# Patient Record
Sex: FEMALE | Race: WHITE | Employment: OTHER | URBAN - METROPOLITAN AREA
[De-identification: names, ages, dates, MRNs, and addresses within clinical notes are randomized per-mention and may not be internally consistent; named-entity substitution may affect disease eponyms.]

---

## 2023-01-29 ENCOUNTER — APPOINTMENT (OUTPATIENT)
Dept: NON INVASIVE DIAGNOSTICS | Age: 78
End: 2023-01-29
Attending: INTERNAL MEDICINE
Payer: MEDICARE

## 2023-01-29 ENCOUNTER — HOSPITAL ENCOUNTER (INPATIENT)
Age: 78
LOS: 4 days | Discharge: HOME | End: 2023-02-02
Attending: INTERNAL MEDICINE
Payer: MEDICARE

## 2023-01-29 ENCOUNTER — APPOINTMENT (OUTPATIENT)
Dept: GENERAL RADIOLOGY | Age: 78
End: 2023-01-29
Attending: INTERNAL MEDICINE
Payer: MEDICARE

## 2023-01-29 DIAGNOSIS — R07.9 CHEST PAIN, UNSPECIFIED TYPE: Primary | ICD-10-CM

## 2023-01-29 PROBLEM — I25.10 CAD (CORONARY ARTERY DISEASE): Chronic | Status: ACTIVE | Noted: 2023-01-29

## 2023-01-29 PROBLEM — R41.0 CONFUSION: Status: ACTIVE | Noted: 2023-01-29

## 2023-01-29 PROBLEM — Z95.1 S/P CABG X 3: Chronic | Status: ACTIVE | Noted: 2023-01-29

## 2023-01-29 PROBLEM — Z79.01 WARFARIN ANTICOAGULATION: Chronic | Status: ACTIVE | Noted: 2023-01-29

## 2023-01-29 PROBLEM — R79.1 SUPRATHERAPEUTIC INR: Status: ACTIVE | Noted: 2023-01-29

## 2023-01-29 PROBLEM — I50.9 CHF (CONGESTIVE HEART FAILURE) (HCC): Chronic | Status: ACTIVE | Noted: 2023-01-29

## 2023-01-29 PROBLEM — E78.5 HLD (HYPERLIPIDEMIA): Chronic | Status: ACTIVE | Noted: 2023-01-29

## 2023-01-29 PROBLEM — I48.0 PAF (PAROXYSMAL ATRIAL FIBRILLATION) (HCC): Chronic | Status: ACTIVE | Noted: 2023-01-29

## 2023-01-29 PROBLEM — R53.1 WEAKNESS: Status: ACTIVE | Noted: 2023-01-29

## 2023-01-29 LAB
ANION GAP SERPL CALC-SCNC: 7 MMOL/L (ref 2–11)
APPEARANCE UR: CLEAR
BACTERIA URNS QL MICRO: NORMAL /HPF
BILIRUB UR QL: NEGATIVE
BUN SERPL-MCNC: 47 MG/DL (ref 8–23)
CALCIUM SERPL-MCNC: 8.4 MG/DL (ref 8.3–10.4)
CASTS URNS QL MICRO: 0 /LPF
CHLORIDE SERPL-SCNC: 100 MMOL/L (ref 101–110)
CHOLEST SERPL-MCNC: 103 MG/DL
CO2 SERPL-SCNC: 28 MMOL/L (ref 21–32)
COLOR UR: NORMAL
CREAT SERPL-MCNC: 1.2 MG/DL (ref 0.6–1)
CRYSTALS URNS QL MICRO: 0 /LPF
DIGOXIN SERPL-MCNC: 1.9 NG/ML (ref 0.9–2.1)
ECHO AO ASC DIAM: 3.5 CM
ECHO AO ASCENDING AORTA INDEX: 2.41 CM/M2
ECHO AO ROOT DIAM: 3.2 CM
ECHO AO ROOT INDEX: 2.21 CM/M2
ECHO AV AREA PEAK VELOCITY: 1.7 CM2
ECHO AV AREA VTI: 1.9 CM2
ECHO AV AREA/BSA PEAK VELOCITY: 1.2 CM2/M2
ECHO AV AREA/BSA VTI: 1.3 CM2/M2
ECHO AV MEAN GRADIENT: 6 MMHG
ECHO AV MEAN VELOCITY: 1.2 M/S
ECHO AV PEAK GRADIENT: 12 MMHG
ECHO AV PEAK VELOCITY: 1.7 M/S
ECHO AV VELOCITY RATIO: 0.59
ECHO AV VTI: 28.6 CM
ECHO BSA: 1.42 M2
ECHO EST RA PRESSURE: 3 MMHG
ECHO IVC PROX: 0.9 CM
ECHO LA AREA 2C: 17.4 CM2
ECHO LA AREA 4C: 16.1 CM2
ECHO LA DIAMETER INDEX: 2.34 CM/M2
ECHO LA DIAMETER: 3.4 CM
ECHO LA MAJOR AXIS: 5 CM
ECHO LA MINOR AXIS: 5 CM
ECHO LA TO AORTIC ROOT RATIO: 1.06
ECHO LA VOL 2C: 49 ML (ref 22–52)
ECHO LA VOL 4C: 41 ML (ref 22–52)
ECHO LA VOL BP: 45 ML (ref 22–52)
ECHO LA VOL/BSA BIPLANE: 31 ML/M2 (ref 16–34)
ECHO LA VOLUME INDEX A2C: 34 ML/M2 (ref 16–34)
ECHO LA VOLUME INDEX A4C: 28 ML/M2 (ref 16–34)
ECHO LV E' LATERAL VELOCITY: 7 CM/S
ECHO LV E' SEPTAL VELOCITY: 5 CM/S
ECHO LV EDV A2C: 102 ML
ECHO LV EDV A4C: 125 ML
ECHO LV EDV INDEX A4C: 86 ML/M2
ECHO LV EDV NDEX A2C: 70 ML/M2
ECHO LV EJECTION FRACTION A2C: 63 %
ECHO LV EJECTION FRACTION A4C: 50 %
ECHO LV EJECTION FRACTION BIPLANE: 57 % (ref 55–100)
ECHO LV ESV A2C: 38 ML
ECHO LV ESV A4C: 62 ML
ECHO LV ESV INDEX A2C: 26 ML/M2
ECHO LV ESV INDEX A4C: 43 ML/M2
ECHO LV FRACTIONAL SHORTENING: 9 % (ref 28–44)
ECHO LV INTERNAL DIMENSION DIASTOLE INDEX: 3.1 CM/M2
ECHO LV INTERNAL DIMENSION DIASTOLIC: 4.5 CM (ref 3.9–5.3)
ECHO LV INTERNAL DIMENSION SYSTOLIC INDEX: 2.83 CM/M2
ECHO LV INTERNAL DIMENSION SYSTOLIC: 4.1 CM
ECHO LV IVSD: 1 CM (ref 0.6–0.9)
ECHO LV MASS 2D: 164 G (ref 67–162)
ECHO LV MASS INDEX 2D: 113.1 G/M2 (ref 43–95)
ECHO LV POSTERIOR WALL DIASTOLIC: 1.1 CM (ref 0.6–0.9)
ECHO LV RELATIVE WALL THICKNESS RATIO: 0.49
ECHO LVOT AREA: 2.8 CM2
ECHO LVOT AV VTI INDEX: 0.68
ECHO LVOT DIAM: 1.9 CM
ECHO LVOT MEAN GRADIENT: 1 MMHG
ECHO LVOT PEAK GRADIENT: 4 MMHG
ECHO LVOT PEAK VELOCITY: 1 M/S
ECHO LVOT STROKE VOLUME INDEX: 37.9 ML/M2
ECHO LVOT SV: 55 ML
ECHO LVOT VTI: 19.4 CM
ECHO MV A VELOCITY: 1.13 M/S
ECHO MV E DECELERATION TIME (DT): 266 MS
ECHO MV E VELOCITY: 0.72 M/S
ECHO MV E/A RATIO: 0.64
ECHO MV E/E' LATERAL: 10.29
ECHO MV E/E' RATIO (AVERAGED): 12.34
ECHO MV E/E' SEPTAL: 14.4
ECHO PV MAX VELOCITY: 0.8 M/S
ECHO PV PEAK GRADIENT: 3 MMHG
ECHO RIGHT VENTRICULAR SYSTOLIC PRESSURE (RVSP): 18 MMHG
ECHO RV BASAL DIMENSION: 3.6 CM
ECHO RV FREE WALL PEAK S': 13 CM/S
ECHO RV TAPSE: 1.5 CM (ref 1.7–?)
ECHO TV REGURGITANT MAX VELOCITY: 1.95 M/S
ECHO TV REGURGITANT PEAK GRADIENT: 15 MMHG
EPI CELLS #/AREA URNS HPF: NORMAL /HPF
ERYTHROCYTE [DISTWIDTH] IN BLOOD BY AUTOMATED COUNT: 20.3 % (ref 11.9–14.6)
EST. AVERAGE GLUCOSE BLD GHB EST-MCNC: 108 MG/DL
GLUCOSE SERPL-MCNC: 115 MG/DL (ref 65–100)
GLUCOSE UR STRIP.AUTO-MCNC: NEGATIVE MG/DL
HBA1C MFR BLD: 5.4 % (ref 4.8–5.6)
HCT VFR BLD AUTO: 27.7 % (ref 35.8–46.3)
HDLC SERPL-MCNC: 46 MG/DL (ref 40–60)
HDLC SERPL: 2.2
HGB BLD-MCNC: 8.9 G/DL (ref 11.7–15.4)
HGB UR QL STRIP: NEGATIVE
INR PPP: 7.8
KETONES UR QL STRIP.AUTO: NEGATIVE MG/DL
LACTATE SERPL-SCNC: 1.1 MMOL/L (ref 0.4–2)
LDLC SERPL CALC-MCNC: 45.6 MG/DL
LEUKOCYTE ESTERASE UR QL STRIP.AUTO: NEGATIVE
LV EF: 53 %
LVEF MODALITY: ABNORMAL
MAGNESIUM SERPL-MCNC: 2.3 MG/DL (ref 1.8–2.4)
MCH RBC QN AUTO: 28.5 PG (ref 26.1–32.9)
MCHC RBC AUTO-ENTMCNC: 32.1 G/DL (ref 31.4–35)
MCV RBC AUTO: 88.8 FL (ref 82–102)
MUCOUS THREADS URNS QL MICRO: 0 /LPF
NITRITE UR QL STRIP.AUTO: NEGATIVE
NRBC # BLD: 0 K/UL (ref 0–0.2)
PH UR STRIP: 5 (ref 5–9)
PLATELET # BLD AUTO: 212 K/UL (ref 150–450)
PMV BLD AUTO: 10.5 FL (ref 9.4–12.3)
POTASSIUM SERPL-SCNC: 4.2 MMOL/L (ref 3.5–5.1)
PROCALCITONIN SERPL-MCNC: <0.05 NG/ML (ref 0–0.49)
PROT UR STRIP-MCNC: NEGATIVE MG/DL
PROTHROMBIN TIME: 66.9 SEC (ref 12.6–14.3)
RBC # BLD AUTO: 3.12 M/UL (ref 4.05–5.2)
RBC #/AREA URNS HPF: 0 /HPF
SODIUM SERPL-SCNC: 135 MMOL/L (ref 133–143)
SP GR UR REFRACTOMETRY: 1.02 (ref 1–1.02)
T4 FREE SERPL-MCNC: 1.3 NG/DL (ref 0.78–1.46)
TRIGL SERPL-MCNC: 57 MG/DL (ref 35–150)
TROPONIN I BLD-MCNC: 0.03 NG/ML (ref 0.02–0.05)
TROPONIN I BLD-MCNC: 0.03 NG/ML (ref 0.02–0.05)
TROPONIN I BLD-MCNC: 0.06 NG/ML (ref 0.02–0.05)
TSH W FREE THYROID IF ABNORMAL: 4.5 UIU/ML (ref 0.36–3.74)
URINE CULTURE IF INDICATED: NORMAL
UROBILINOGEN UR QL STRIP.AUTO: 0.2 EU/DL (ref 0.2–1)
VLDLC SERPL CALC-MCNC: 11.4 MG/DL (ref 6–23)
WBC # BLD AUTO: 5.7 K/UL (ref 4.3–11.1)
WBC URNS QL MICRO: NORMAL /HPF

## 2023-01-29 PROCEDURE — 84484 ASSAY OF TROPONIN QUANT: CPT

## 2023-01-29 PROCEDURE — 6370000000 HC RX 637 (ALT 250 FOR IP): Performed by: PHYSICIAN ASSISTANT

## 2023-01-29 PROCEDURE — A4216 STERILE WATER/SALINE, 10 ML: HCPCS | Performed by: INTERNAL MEDICINE

## 2023-01-29 PROCEDURE — 2500000003 HC RX 250 WO HCPCS: Performed by: INTERNAL MEDICINE

## 2023-01-29 PROCEDURE — 71045 X-RAY EXAM CHEST 1 VIEW: CPT

## 2023-01-29 PROCEDURE — 84145 PROCALCITONIN (PCT): CPT

## 2023-01-29 PROCEDURE — 81001 URINALYSIS AUTO W/SCOPE: CPT

## 2023-01-29 PROCEDURE — 83735 ASSAY OF MAGNESIUM: CPT

## 2023-01-29 PROCEDURE — 36415 COLL VENOUS BLD VENIPUNCTURE: CPT

## 2023-01-29 PROCEDURE — C8929 TTE W OR WO FOL WCON,DOPPLER: HCPCS

## 2023-01-29 PROCEDURE — 99223 1ST HOSP IP/OBS HIGH 75: CPT | Performed by: INTERNAL MEDICINE

## 2023-01-29 PROCEDURE — 86923 COMPATIBILITY TEST ELECTRIC: CPT

## 2023-01-29 PROCEDURE — 2580000003 HC RX 258: Performed by: NURSE PRACTITIONER

## 2023-01-29 PROCEDURE — 6360000004 HC RX CONTRAST MEDICATION: Performed by: INTERNAL MEDICINE

## 2023-01-29 PROCEDURE — 86901 BLOOD TYPING SEROLOGIC RH(D): CPT

## 2023-01-29 PROCEDURE — 2580000003 HC RX 258: Performed by: INTERNAL MEDICINE

## 2023-01-29 PROCEDURE — 83036 HEMOGLOBIN GLYCOSYLATED A1C: CPT

## 2023-01-29 PROCEDURE — 80048 BASIC METABOLIC PNL TOTAL CA: CPT

## 2023-01-29 PROCEDURE — 84443 ASSAY THYROID STIM HORMONE: CPT

## 2023-01-29 PROCEDURE — 1100000003 HC PRIVATE W/ TELEMETRY

## 2023-01-29 PROCEDURE — 84439 ASSAY OF FREE THYROXINE: CPT

## 2023-01-29 PROCEDURE — 85027 COMPLETE CBC AUTOMATED: CPT

## 2023-01-29 PROCEDURE — 6370000000 HC RX 637 (ALT 250 FOR IP): Performed by: NURSE PRACTITIONER

## 2023-01-29 PROCEDURE — 94640 AIRWAY INHALATION TREATMENT: CPT

## 2023-01-29 PROCEDURE — 93306 TTE W/DOPPLER COMPLETE: CPT | Performed by: INTERNAL MEDICINE

## 2023-01-29 PROCEDURE — 94761 N-INVAS EAR/PLS OXIMETRY MLT: CPT

## 2023-01-29 PROCEDURE — 94760 N-INVAS EAR/PLS OXIMETRY 1: CPT

## 2023-01-29 PROCEDURE — 80162 ASSAY OF DIGOXIN TOTAL: CPT

## 2023-01-29 PROCEDURE — 83605 ASSAY OF LACTIC ACID: CPT

## 2023-01-29 PROCEDURE — 85610 PROTHROMBIN TIME: CPT

## 2023-01-29 PROCEDURE — 80061 LIPID PANEL: CPT

## 2023-01-29 RX ORDER — METOPROLOL SUCCINATE 25 MG/1
25 TABLET, EXTENDED RELEASE ORAL DAILY
Status: DISCONTINUED | OUTPATIENT
Start: 2023-01-29 | End: 2023-02-02 | Stop reason: HOSPADM

## 2023-01-29 RX ORDER — ACETAMINOPHEN 325 MG/1
650 TABLET ORAL EVERY 6 HOURS PRN
Status: DISCONTINUED | OUTPATIENT
Start: 2023-01-29 | End: 2023-02-02 | Stop reason: HOSPADM

## 2023-01-29 RX ORDER — NITROGLYCERIN 0.4 MG/1
0.4 TABLET SUBLINGUAL EVERY 5 MIN PRN
Status: DISCONTINUED | OUTPATIENT
Start: 2023-01-29 | End: 2023-02-02 | Stop reason: HOSPADM

## 2023-01-29 RX ORDER — SODIUM CHLORIDE 0.9 % (FLUSH) 0.9 %
5-40 SYRINGE (ML) INJECTION EVERY 12 HOURS SCHEDULED
Status: DISCONTINUED | OUTPATIENT
Start: 2023-01-29 | End: 2023-02-02 | Stop reason: HOSPADM

## 2023-01-29 RX ORDER — METOPROLOL SUCCINATE 25 MG/1
25 TABLET, EXTENDED RELEASE ORAL DAILY
COMMUNITY

## 2023-01-29 RX ORDER — SODIUM CHLORIDE 0.9 % (FLUSH) 0.9 %
5-40 SYRINGE (ML) INJECTION PRN
Status: DISCONTINUED | OUTPATIENT
Start: 2023-01-29 | End: 2023-02-02 | Stop reason: HOSPADM

## 2023-01-29 RX ORDER — VENLAFAXINE 37.5 MG/1
37.5 TABLET ORAL DAILY
COMMUNITY

## 2023-01-29 RX ORDER — ACETAMINOPHEN 650 MG/1
650 SUPPOSITORY RECTAL EVERY 6 HOURS PRN
Status: DISCONTINUED | OUTPATIENT
Start: 2023-01-29 | End: 2023-02-02 | Stop reason: HOSPADM

## 2023-01-29 RX ORDER — MAGNESIUM HYDROXIDE/ALUMINUM HYDROXICE/SIMETHICONE 120; 1200; 1200 MG/30ML; MG/30ML; MG/30ML
30 SUSPENSION ORAL EVERY 6 HOURS PRN
Status: DISCONTINUED | OUTPATIENT
Start: 2023-01-29 | End: 2023-02-02 | Stop reason: HOSPADM

## 2023-01-29 RX ORDER — ONDANSETRON 4 MG/1
4 TABLET, ORALLY DISINTEGRATING ORAL EVERY 8 HOURS PRN
Status: DISCONTINUED | OUTPATIENT
Start: 2023-01-29 | End: 2023-02-02 | Stop reason: HOSPADM

## 2023-01-29 RX ORDER — DIGOXIN 125 MCG
125 TABLET ORAL DAILY
Status: DISCONTINUED | OUTPATIENT
Start: 2023-01-29 | End: 2023-01-29

## 2023-01-29 RX ORDER — POLYETHYLENE GLYCOL 3350 17 G/17G
17 POWDER, FOR SOLUTION ORAL DAILY PRN
Status: DISCONTINUED | OUTPATIENT
Start: 2023-01-29 | End: 2023-02-02 | Stop reason: HOSPADM

## 2023-01-29 RX ORDER — SPIRONOLACTONE 25 MG/1
25 TABLET ORAL 2 TIMES DAILY
Status: ON HOLD | COMMUNITY
End: 2023-02-02 | Stop reason: HOSPADM

## 2023-01-29 RX ORDER — ONDANSETRON 2 MG/ML
4 INJECTION INTRAMUSCULAR; INTRAVENOUS EVERY 6 HOURS PRN
Status: DISCONTINUED | OUTPATIENT
Start: 2023-01-29 | End: 2023-02-02 | Stop reason: HOSPADM

## 2023-01-29 RX ORDER — FERROUS SULFATE 325(65) MG
325 TABLET ORAL
COMMUNITY

## 2023-01-29 RX ORDER — FLUTICASONE PROPIONATE AND SALMETEROL 100; 50 UG/1; UG/1
1 POWDER RESPIRATORY (INHALATION) 2 TIMES DAILY
Status: ON HOLD | COMMUNITY
End: 2023-01-31

## 2023-01-29 RX ORDER — MORPHINE SULFATE 2 MG/ML
2 INJECTION, SOLUTION INTRAMUSCULAR; INTRAVENOUS
Status: DISCONTINUED | OUTPATIENT
Start: 2023-01-29 | End: 2023-02-02 | Stop reason: HOSPADM

## 2023-01-29 RX ORDER — MORPHINE SULFATE 4 MG/ML
4 INJECTION, SOLUTION INTRAMUSCULAR; INTRAVENOUS
Status: DISCONTINUED | OUTPATIENT
Start: 2023-01-29 | End: 2023-02-02 | Stop reason: HOSPADM

## 2023-01-29 RX ORDER — DIGOXIN 125 MCG
125 TABLET ORAL DAILY
Status: ON HOLD | COMMUNITY
End: 2023-02-02 | Stop reason: HOSPADM

## 2023-01-29 RX ORDER — FERROUS SULFATE 325(65) MG
325 TABLET ORAL
Status: DISCONTINUED | OUTPATIENT
Start: 2023-01-29 | End: 2023-02-02 | Stop reason: HOSPADM

## 2023-01-29 RX ORDER — VENLAFAXINE 25 MG/1
37.5 TABLET ORAL DAILY
Status: DISCONTINUED | OUTPATIENT
Start: 2023-01-29 | End: 2023-02-02 | Stop reason: HOSPADM

## 2023-01-29 RX ORDER — TRAMADOL HYDROCHLORIDE 50 MG/1
50 TABLET ORAL EVERY 6 HOURS PRN
Status: DISCONTINUED | OUTPATIENT
Start: 2023-01-29 | End: 2023-02-02 | Stop reason: HOSPADM

## 2023-01-29 RX ORDER — ATORVASTATIN CALCIUM 40 MG/1
40 TABLET, FILM COATED ORAL DAILY
Status: DISCONTINUED | OUTPATIENT
Start: 2023-01-29 | End: 2023-02-02 | Stop reason: HOSPADM

## 2023-01-29 RX ORDER — SODIUM CHLORIDE 9 MG/ML
INJECTION, SOLUTION INTRAVENOUS PRN
Status: DISCONTINUED | OUTPATIENT
Start: 2023-01-29 | End: 2023-02-02 | Stop reason: HOSPADM

## 2023-01-29 RX ORDER — POTASSIUM CHLORIDE 7.45 MG/ML
10 INJECTION INTRAVENOUS PRN
Status: DISCONTINUED | OUTPATIENT
Start: 2023-01-29 | End: 2023-02-02 | Stop reason: HOSPADM

## 2023-01-29 RX ORDER — FUROSEMIDE 20 MG/1
20 TABLET ORAL DAILY
Status: ON HOLD | COMMUNITY
End: 2023-02-02 | Stop reason: HOSPADM

## 2023-01-29 RX ORDER — MAGNESIUM SULFATE IN WATER 40 MG/ML
2000 INJECTION, SOLUTION INTRAVENOUS PRN
Status: DISCONTINUED | OUTPATIENT
Start: 2023-01-29 | End: 2023-02-02 | Stop reason: HOSPADM

## 2023-01-29 RX ORDER — POTASSIUM CHLORIDE 20 MEQ/1
40 TABLET, EXTENDED RELEASE ORAL PRN
Status: DISCONTINUED | OUTPATIENT
Start: 2023-01-29 | End: 2023-02-02 | Stop reason: HOSPADM

## 2023-01-29 RX ORDER — ASPIRIN 81 MG/1
81 TABLET, CHEWABLE ORAL DAILY
Status: DISCONTINUED | OUTPATIENT
Start: 2023-01-30 | End: 2023-02-02 | Stop reason: HOSPADM

## 2023-01-29 RX ORDER — ATORVASTATIN CALCIUM 40 MG/1
40 TABLET, FILM COATED ORAL NIGHTLY
Status: DISCONTINUED | OUTPATIENT
Start: 2023-01-29 | End: 2023-01-29

## 2023-01-29 RX ORDER — ATORVASTATIN CALCIUM 40 MG/1
40 TABLET, FILM COATED ORAL DAILY
COMMUNITY

## 2023-01-29 RX ADMIN — SODIUM CHLORIDE, PRESERVATIVE FREE 10 ML: 5 INJECTION INTRAVENOUS at 20:11

## 2023-01-29 RX ADMIN — MOMETASONE FUROATE AND FORMOTEROL FUMARATE DIHYDRATE 2 PUFF: 100; 5 AEROSOL RESPIRATORY (INHALATION) at 20:52

## 2023-01-29 RX ADMIN — ATORVASTATIN CALCIUM 40 MG: 40 TABLET, FILM COATED ORAL at 08:41

## 2023-01-29 RX ADMIN — DIGOXIN 125 MCG: 125 TABLET ORAL at 08:41

## 2023-01-29 RX ADMIN — METOPROLOL SUCCINATE 25 MG: 25 TABLET, EXTENDED RELEASE ORAL at 08:39

## 2023-01-29 RX ADMIN — MOMETASONE FUROATE AND FORMOTEROL FUMARATE DIHYDRATE 2 PUFF: 100; 5 AEROSOL RESPIRATORY (INHALATION) at 09:47

## 2023-01-29 RX ADMIN — VENLAFAXINE 37.5 MG: 25 TABLET ORAL at 08:39

## 2023-01-29 RX ADMIN — TUBERCULIN PURIFIED PROTEIN DERIVATIVE 5 UNITS: 5 INJECTION, SOLUTION INTRADERMAL at 13:45

## 2023-01-29 RX ADMIN — TRAMADOL HYDROCHLORIDE 50 MG: 50 TABLET ORAL at 08:45

## 2023-01-29 RX ADMIN — FERROUS SULFATE TAB 325 MG (65 MG ELEMENTAL FE) 325 MG: 325 (65 FE) TAB at 08:41

## 2023-01-29 RX ADMIN — PERFLUTREN 0.45 ML: 6.52 INJECTION, SUSPENSION INTRAVENOUS at 09:45

## 2023-01-29 RX ADMIN — SODIUM CHLORIDE, PRESERVATIVE FREE 10 ML: 5 INJECTION INTRAVENOUS at 08:43

## 2023-01-29 ASSESSMENT — PAIN SCALES - GENERAL
PAINLEVEL_OUTOF10: 0
PAINLEVEL_OUTOF10: 6
PAINLEVEL_OUTOF10: 0

## 2023-01-29 ASSESSMENT — PAIN DESCRIPTION - ORIENTATION: ORIENTATION: LEFT

## 2023-01-29 ASSESSMENT — PAIN DESCRIPTION - LOCATION: LOCATION: LEG

## 2023-01-29 ASSESSMENT — PAIN DESCRIPTION - DESCRIPTORS: DESCRIPTORS: CRAMPING

## 2023-01-29 NOTE — PROGRESS NOTES
Reviewed notes for new spiritual concerns      Will continue to assess how we can best serve this family        [place].       Per notes:       [facility]        DNR    NO DIRECTIVES    HIGH RISK FALLS

## 2023-01-29 NOTE — PROGRESS NOTES
[facility] Hospitalist Service  At the heart of better care     Advance Care Planning   Admit Date:  2023  3:28 AM   Name:  Charmayne Portland   Age:  65 y.o. Sex:  female  :  1945   MRN:  [id number]   Room:  [facility]/    Charmayne Portland is able to make her own decisions:   NO    If pt unable to make decisions, POA/surrogate decision maker:  Penelope Lesley ()    Other people present:       Patient / surrogate decision-maker directed code status:  Full code    Other ACP topics discussed, if applicable:   none    Patient or surrogate consented to discussion of the current conditions, workup, management plans, prognosis, and the risk for further deterioration. Time spent: 17 minutes in direct discussion.       Signed:  Carlos Vuong MD

## 2023-01-29 NOTE — PROGRESS NOTES
TRANSFER - IN REPORT:    Verbal report received from Andi Baird, [facility] on Fantasma Nolasco  being received from [facility] ED for routine progression of patient care      Report consisted of patient's Situation, Background, Assessment and   Recommendations(SBAR). Information from the following report(s) ED Encounter Summary and ED SBAR was reviewed with the receiving nurse. Opportunity for questions and clarification was provided. Assessment completed upon patient's arrival to unit and care assumed. Dual skin assessment performed. Unable to accurately complete home med list due to pt confusion and family unable to remember. Family states there should be 10 pills. Pt has a lot of bruising in her BUE and her heels are noted to be slightly pink, but are blanchable.

## 2023-01-29 NOTE — H&P
[facility] CARDIOLOGY   History & Physical                 Primary Cardiologist: MD in [address] [place] [address]    Primary Care Physician: Dr. Florecita Meléndez    Admitting Physician: Dr. Jazmine Arellano:     Patient is a 65 y. o.  female with reported PMHx of CAD, CABG, HLD, PAF, CHF and confusion who presented to [facility] in transfer from [facility] in [address] [place] [address] for evaluation of CP. Please note: Pt is confused and alert to person only although she is pleasant and does answer some questions appropriately -- unclear accuracy of answers. Spouse is present and attempts to provide medical history but is a poor historian and states and all her records are in her chart. No access to any outside records at this time other than the ED report that came with Pt. Pt apparently presented to the ED at [facility] around 1600 on Saturday for evaluation of CP and worsening confusion and was making repetative statements.  states Pt c/o CP but that he was more concerned because she was weak and was having to use her walker. Usually able to ambulate w/o walker. States she had weakness in her arms and legs bilaterally. They were on the way to the ED and she started to get more confused. He stopped at the [facility] and EMS was called. She was given 324mg ASA. He denies recent fever but states that Friday night she had night sweats and was \"soaking wet\" but that she sleeps under an electric blanket. He states that she takes 10 pills. Takes all meds 1x daily except for the Sherlyn Mad that she is supposed to take 2x daily but usually only takes 1x daily. He states she is on meds for high cholesterol as well. Denies any DM. States she has been taking Warfarin for <6mo. Initially he is unsure why she is taking Warfarin. States she gets INR checked every month. Last known value was 2.7 but she hasn't had level checked yet this month. When suggested that Warfarin is typically for Afib or DVT/PE spouse states that Pt has \"Afib. \" She has apparently had this for some time but was just started on the Warfarin more recently per his report. He states that she has a Cardiologist in [address] [place] [address] but that her MD \"quit\" and they have not seen her new MD yet. She has a PCP that she sees routinely. Reports has labs about 1x/yr but he is unsure of any recent values. He is concerned that she is \"anemic. \" Reports noting darker than usual stools. He states she was Hospitalized in Oct/Nov and diagnosed with \"heart failure\" and required 2u blood during admit for \"bleeding. \" States they were told her heart was \"17%. \" Reports that prior to this hospitalization he was unaware that Pt had any heart failure. States when she had her CABG 8-9 yrs ago they were told her heart was \"strong. \" They deny any stents and state no other cardiac tests recently. W/u done in the ED -- CT head (-) acute findings, Trop 0.050 (range 0->0.034). UA done (no results posted). Reportedly COVID (-). She was given 2mg Morphine x2 and 2.5mg IVP Haldol for her confusion. Per ER MD note -- Pts Hgb 9.1 and this was stable per her baseline in their system. Per ED records sent with Pt -- PMHx also includes: RYAN, Anxiety, COPD, Depression, Tobacco abuse, Raynaud's, Sleep apnea, RA and TIA as well as history of coronary PCI/stent (unknown date/time). She apparently has a spinal stimulator (2019), Nuclear stress test (2015), CABG (5/2014), s/p appendectomy and hysterectomy as well as dilation of esophageal stricture. No results of these procedures -- just listed on paperwork. Past Medical History:   Diagnosis Date    Vapes nicotine containing substance       No past surgical history on file. Allergies   Allergen Reactions    Tetanus Toxoids      Social History     Tobacco Use    Smoking status: Not on file    Smokeless tobacco: Not on file   Substance Use Topics    Alcohol use: Not on file      FH: No family history on file.      Review of Systems  General: no recent weight change, +weakness, +fatigue, no fevers, +sweats, no change in appetite   Skin: no new rashes, wounds, sores or other skin changes  HEENT: no headache, dizziness, lightheadedness, vision changes, hearing changes, sinus pressure/pain/congestion, bleeding gums or sore throat  Neck: no swollen glands, goiter, pain or stiffness  Respiratory: no cough, no congestion, no sputum, no hemoptysis, no dyspnea, no wheezing  Cardiovascular: + as per HPI, no palpitations, syncope, orthopnea, PND  Gastrointestinal: no increased reflux, no constipation, diarrhea, liver problems, GI bleeding, no abdominal pain or distension, no N/V  Urinary: no frequency, urgency, hematuria, burning/pain with urination, flank pain or difficulty urinating  Peripheral Vascular: no claudication, leg cramps, prior DVTs, no swelling of BLE, no color change, or swelling with redness or tenderness  Musculoskeletal: no new muscle or joint pain/stiffness, joint swelling, erythema of joints, or back pain  Psychiatric: no increased depression, anxiety or excessive stress  Neurological: no sensory or motor loss, seizures, syncope, tremors, numbness, tingling, no changes in mood, attention, or speech, +AMS  Hematologic: +anemia, +easy bruising or bleeding  Endocrine: no thyroid problems, no heat or cold intolerance, excessive sweating, polyuria, polydipsia, no diabetes. Objective:       BP (!) 93/52   Pulse 65   Temp 98 °F (36.7 °C) (Oral)   Resp 14   Ht 5' 4\" (1.626 m)   SpO2 98%     No intake/output data recorded. No intake/output data recorded.     Physical Exam:  General: thin, frail and unhealthy appearing female, lying completely supine in bed, NAD, resting comfortably  HEENT: PERRLA, no abnormalities noted, sclera clear, EOMs intact  Neck: supple, no JVD, trachea midline  Heart: S1S2 with RRR with frequent ectopy, +murmur, no rubs or gallops  Lungs: CTAB, normal effort on RA, no wheezing or rales  Abd: soft, nontender, nondistended, +bowel sounds  Ext: warm, no edema, calves supple/nontender, pulses 2+ bilaterally  Skin: warm and dry, intact to view, scattered bruises  Psychiatric: appropriate mood and affect, cooperative  Neurologic: A&O to self, moves all 4 equally, CNs grossly intact      ECG: SR, sinus arrhythmia, with frequent PACs and PVC, I/L inverted T-waves    ECHO: ordered and pending    CXR: per report -- unable to see images:  Trace bilateral pleural effusions The lungs otherwise appear relatively clear. Data Review:     H/H 9.1/28.0  WBCs 5.8  PT 84.6  INR 7.4    K 3.7  Ca 8.2  BUN 42.5  Cr 1.00  ALT 20  AST 38       No results found for this or any previous visit (from the past 24 hour(s)). Assessment/Plan:   Principal Problem:    Chest pain -- Pt denies any CP at present, admit to tele, monitor for possible arrhythmias, trend serial [place], cont ASA, topical NTG for recurrent CP, no heparin with INR 7.4, check ECHO this AM, further POC pending w/u, access to prior records and available information on current health situation is limited by administrative issues (unable to see any prior records due to different hospital system) and Pt and spouse overall poor historians and really unable to provide much clinical information - will try to request additional records in AM. D/w Pt and family that further testing -- even poss [facility] may be indicated if she continues to have CP or if trops continue to rise and she has new ECHO findings. Hard to know what is new as we have no records whatsoever to compare to at this time.      Active Problems:    Weakness -- unclear source, started Friday per spouse, check UA, LA and PCT to r/o possibility of underlying infectious process      HLD (hyperlipidemia) -- check lipids,       CAD (coronary artery disease) -- cont ASA and statin, BB and ARNI as BP tolerates       S/P CABG x 3 -- unknown vessels, sx was approx 8-9 yrs ago      PAF (paroxysmal atrial fibrillation) -- Pt is taking the Warfarin for this diagnosis -- monitor on tele for arrhythmias    [id number] Score for Atrial Fibrillation Stroke Risk   Risk   Factors  Component Value   C CHF Yes 1   H HTN No 0   A2 Age >= 76 Yes,  (73 y.o.) 2   D DM No 0   S2 Prior Stroke/TIA No 0   V Vascular Disease No 0   A Age 74-69 No,  (73 y.o.) 0   Sc Sex female 1    [id number]  Score  4   Score last updated 1/29/23 4:46 AM EST        Warfarin anticoagulation -- follow daily INRs, med on hold presently      Supratherapeutic INR -- follow daily INRs, unclear reason for significantly elevated INR at this time, check occult stool, f/u HH      Confusion -- Pt with hx of baseline confusion, worse when in hospital per spouse, possibly has underlying dementia -- CT done PTA w/o acute findings to explain confusion      Congestive heart failure -- per spouse report sounds like EF 17% ? ? -- will get ECHO today to define, per her med list appears she likely has some degree of HFrEF as she takes BB, ARNI, MRA and Lasix as OP -- meds entered into system by RN per the list from the ED at [facility] -- unclear if meds are the most up-to-date -- will start and see how Pt tolerates, will adjust meds pending clinical course and ECHO results, she does not appear to have VO at present      Pt noted to have hyponatremia on PTA labs -- possibly this is causing some of her confusion ??, unknown chronicity -- with multiple underlying medical problems and new/worse confusion -- will consult Hospitalist for assistance with overall care management and evaluation and management of her hyponatremia (diet vs diuretic vs other cause). Will continue to w/u her CP complaints and try to better define her degree of CHF. Will need to be cautious with use of IVF.        RADHA Villalta - ONESIMO-C  1/29/2023  4:49 AM

## 2023-01-29 NOTE — CONSULTS
[facility] Hospitalist Consult   Admit Date:  2023  3:28 AM   Name:  Charmayne Portland   Age:  65 y.o. Sex:  female  :  1945   MRN:  [id number]   Room:  [facility]/01    Presenting Complaint: No chief complaint on file. Reason(s) for Admission: Chest pain [R07.9]     Hospitalists consulted by Radhika Roy MD for: take over care, medical management. History of Presenting Illness:   Charmayne Portland is a 65 y.o. female with history of CAD, CABG, pAFIB, dementia and CHF who presented with chest pain. Patient was initially admitted the cardiology service for chest pain evaluation. After review of the records and lab results it was determined that she not in ACS. Further investigation revealed that patient was actually here for weakness. History was obtained from review of EHR and conversation with  and patient. Patient has dementia making her an unreliable historian. At time of interview she has no complaints of chest pain, nausea, vomiting, sob or fever. Assessment & Plan:   Chest pain: ruled out as troponin are not significantly elevated and patient actually had upper extremity pain. Cardiology has reviewed her ECG and found no evidence of Cardiac ischemia. Cardiology has s/o and transferred care to the hospitalist service for further management. Supratherapuetic INR: no evidence of bleeding, will monitor INR and watch for exsanguination    Pafib: holding coumadin. Will continue Metoprolol    Congestive Heart Failure:  reports EF of 15%. Will await echocardiogram results. Appears compensated    Dementia: aware    Hyponatremia: resolved. Seen on PTA labs sent in transfer    HLD: stain, flp pending    CAD: no c/o chest pain, will continue GDMT    Weakness: she will be evaluated by PT/OT and have ppd placed      Diet:  ADULT DIET; Regular; 4 carb choices (60 gm/meal); Low Fat/Low Chol/High Fiber/2 gm Na;  No Caffeine, No red dye  DVT PPx: Supratherapeutic INR  Code status: DNR    Principal Problem:    Chest pain  Active Problems:    Weakness    HLD (hyperlipidemia)    CAD (coronary artery disease)    S/P CABG x 3    PAF (paroxysmal atrial fibrillation) ([facility])    Warfarin anticoagulation    Supratherapeutic INR    Confusion    CHF (congestive heart failure) ([facility]  Resolved Problems:    * No resolved hospital problems. *      Past History:  Past Medical History:   Diagnosis Date    Vapes nicotine containing substance        No past surgical history on file. Social History     Tobacco Use    Smoking status: Not on file    Smokeless tobacco: Not on file   Substance Use Topics    Alcohol use: Not on file      Social History     Substance and Sexual Activity   Drug Use Not on file       No family history on file. There is no immunization history on file for this patient.   Allergies   Allergen Reactions    Tetanus Toxoids       Current Facility-Administered Medications   Medication Dose Route Frequency    sodium chloride flush 0.9 % injection 5-40 mL  5-40 mL IntraVENous 2 times per day    sodium chloride flush 0.9 % injection 5-40 mL  5-40 mL IntraVENous PRN    0.9 % sodium chloride infusion   IntraVENous PRN    ondansetron (ZOFRAN-ODT) disintegrating tablet 4 mg  4 mg Oral Q8H PRN    Or    ondansetron (ZOFRAN) injection 4 mg  4 mg IntraVENous Q6H PRN    acetaminophen (TYLENOL) tablet 650 mg  650 mg Oral Q6H PRN    Or    acetaminophen (TYLENOL) suppository 650 mg  650 mg Rectal Q6H PRN    polyethylene glycol (GLYCOLAX) packet 17 g  17 g Oral Daily PRN    [START ON 1/30/2023] aspirin chewable tablet 81 mg  81 mg Oral Daily    potassium chloride (KLOR-CON M) extended release tablet 40 mEq  40 mEq Oral PRN    Or    potassium bicarb-citric acid (EFFER-K) effervescent tablet 40 mEq  40 mEq Oral PRN    Or    potassium chloride 10 mEq/100 mL IVPB (Peripheral Line)  10 mEq IntraVENous PRN    magnesium sulfate 2000 mg in 50 mL IVPB premix  2,000 mg IntraVENous PRN    aluminum & magnesium hydroxide-simethicone (MAALOX) 273-513-18 MG/5ML suspension 30 mL  30 mL Oral Q6H PRN    nitroGLYCERIN (NITROSTAT) SL tablet 0.4 mg  0.4 mg SubLINGual Q5 Min PRN    morphine (PF) injection 2 mg  2 mg IntraVENous Q2H PRN    Or    morphine injection 4 mg  4 mg IntraVENous Q2H PRN    atorvastatin (LIPITOR) tablet 40 mg  40 mg Oral Daily    ferrous sulfate (IRON 325) tablet 325 mg  325 mg Oral Daily with breakfast    mometasone-formoterol (DULERA) 100-5 MCG/ACT inhaler 2 puff  2 puff Inhalation BID    metoprolol succinate (TOPROL XL) extended release tablet 25 mg  25 mg Oral Daily    sacubitril-valsartan (ENTRESTO) 24-26 MG per tablet 1 tablet  1 tablet Oral BID    venlafaxine (EFFEXOR) tablet 37.5 mg  37.5 mg Oral Daily    traMADol (ULTRAM) tablet 50 mg  50 mg Oral Q6H PRN       Objective:   Patient Vitals for the past 24 hrs:   Temp Pulse Resp BP SpO2   01/29/23 0948 -- -- -- -- 98 %   01/29/23 0839 -- (!) 108 -- -- --   01/29/23 0827 97.9 °F (36.6 °C) 86 18 (!) 94/53 98 %   01/29/23 0430 98 °F (36.7 °C) 65 14 (!) 93/52 98 %       Oxygen Therapy  SpO2: 98 %  Pulse Oximeter Device Mode: Intermittent  O2 Device: None (Room air)    Estimated body mass index is 16.99 kg/m² as calculated from the following:    Height as of this encounter: 5' 4\" (1.626 m). Weight as of this encounter: 99 lb (44.9 kg). Intake/Output Summary (Last 24 hours) at 1/29/2023 1225  Last data filed at 1/29/2023 1215  Gross per 24 hour   Intake 120 ml   Output 100 ml   Net 20 ml         Physical Exam:    Blood pressure (!) 94/53, pulse (!) 108, temperature 97.9 °F (36.6 °C), temperature source Axillary, resp. rate 18, height 5' 4\" (1.626 m), weight 99 lb (44.9 kg), SpO2 98 %. General:    Well nourished. Frail. Thin  Head:  Normocephalic, atraumatic  Eyes:  Sclerae appear normal.  Pupils equally round. ENT:  Nares appear normal.  Moist oral mucosa  Neck:  No restricted ROM. Trachea midline   CV:   RRR. No m/r/g.   No jugular venous distension. Lungs:   CTAB. No wheezing, rhonchi, or rales. Symmetric expansion. Abdomen:   Soft, nontender, nondistended. Extremities: No cyanosis or clubbing. No edema  Skin:     No rashes and normal coloration. Warm and dry. Neuro:  CN II-XII grossly intact. Sensation intact. A+O x1-2  Psych:  Normal mood and affect.       I have personally reviewed labs and tests showing:  Recent Labs:  Recent Results (from the past 24 hour(s))   CBC    Collection Time: 01/29/23  5:07 AM   Result Value Ref Range    WBC 5.7 4.3 - 11.1 K/uL    RBC 3.12 (L) 4.05 - 5.2 M/uL    Hemoglobin 8.9 (L) 11.7 - 15.4 g/dL    Hematocrit 27.7 (L) 35.8 - 46.3 %    MCV 88.8 82 - 102 FL    MCH 28.5 26.1 - 32.9 PG    MCHC 32.1 31.4 - 35.0 g/dL    RDW 20.3 (H) 11.9 - 14.6 %    Platelets 819 264 - 641 K/uL    MPV 10.5 9.4 - 12.3 FL    nRBC 0.00 0.0 - 0.2 K/uL   Hemoglobin A1c    Collection Time: 01/29/23  5:07 AM   Result Value Ref Range    Hemoglobin A1C 5.4 4.8 - 5.6 %    eAG 108 mg/dL   Lipid Panel    Collection Time: 01/29/23  5:07 AM   Result Value Ref Range    Cholesterol, Total 103 <200 MG/DL    Triglycerides 57 35 - 150 MG/DL    HDL 46 40 - 60 MG/DL    LDL Calculated 45.6 <100 MG/DL    VLDL Cholesterol Calculated 11.4 6.0 - 23.0 MG/DL    Chol/HDL Ratio 2.2     Magnesium    Collection Time: 01/29/23  5:07 AM   Result Value Ref Range    Magnesium 2.3 1.8 - 2.4 mg/dL   Protime-INR    Collection Time: 01/29/23  5:07 AM   Result Value Ref Range    Protime 66.9 (H) 12.6 - 14.3 sec    INR 7.8 (HH)     TSH with Reflex    Collection Time: 01/29/23  5:07 AM   Result Value Ref Range    TSH w Free Thyroid if Abnormal 4.50 (H) 0.358 - 3.740 UIU/ML   Lactic Acid    Collection Time: 01/29/23  5:07 AM   Result Value Ref Range    Lactic Acid, Plasma 1.1 0.4 - 2.0 MMOL/L   Procalcitonin    Collection Time: 01/29/23  5:07 AM   Result Value Ref Range    Procalcitonin <0.05 0.00 - 0.49 ng/mL   POCT troponin    Collection Time: 01/29/23  5:07 AM   Result Value Ref Range    POC Troponin I 0.06 (H) 0.02 - 0.05 ng/ml   T4, Free    Collection Time: 01/29/23  5:07 AM   Result Value Ref Range    T4 Free 1.3 0.78 - 1.46 NG/DL   TYPE AND SCREEN    Collection Time: 01/29/23  7:20 AM   Result Value Ref Range    Crossmatch expiration date 02/01/2023,2359     ABO/Rh O POSITIVE     Antibody Screen NEG    POCT troponin    Collection Time: 01/29/23  7:22 AM   Result Value Ref Range    POC Troponin I 0.03 0.02 - 0.05 ng/ml   Transthoracic echocardiogram (TTE) complete with contrast, bubble, strain, and 3D PRN    Collection Time: 01/29/23  9:55 AM   Result Value Ref Range    LV EDV A2C 102 mL    LV EDV A4C 125 mL    LV ESV A2C 38 mL    LV ESV A4C 62 mL    IVSd 1.0 (A) 0.6 - 0.9 cm    LVIDd 4.5 3.9 - 5.3 cm    LVIDs 4.1 cm    LVOT Diameter 1.9 cm    LVOT Mean Gradient 1 mmHg    LVOT VTI 19.4 cm    LVOT Peak Velocity 1.0 m/s    LVOT Peak Gradient 4 mmHg    LVPWd 1.1 (A) 0.6 - 0.9 cm    LV E' Lateral Velocity 7 cm/s    LV E' Septal Velocity 5 cm/s    LV Ejection Fraction A2C 63 %    LV Ejection Fraction A4C 50 %    EF BP 57 55 - 100 %    LVOT Area 2.8 cm2    LVOT SV 55.0 ml    LA Minor Axis 5.0 cm    LA Major Axis 5.0 cm    LA Area 2C 17.4 cm2    LA Area 4C 16.1 cm2    LA Volume 2C 49 22 - 52 mL    LA Volume 4C 41 22 - 52 mL    LA Volume BP 45 22 - 52 mL    LA Diameter 3.4 cm    AV Mean Velocity 1.2 m/s    AV Mean Gradient 6 mmHg    AV VTI 28.6 cm    AV Peak Velocity 1.7 m/s    AV Peak Gradient 12 mmHg    AV Area by VTI 1.9 cm2    AV Area by Peak Velocity 1.7 cm2    Aortic Root 3.2 cm    Ascending Aorta 3.5 cm    IVC Proxmal 0.9 cm    MV E Wave Deceleration Time 266.0 ms    MV A Velocity 1.13 m/s    MV E Velocity 0.72 m/s    PV Max Velocity 0.8 m/s    PV Peak Gradient 3 mmHg    Est. RA Pressure 3 mmHg    RV Basal Dimension 3.6 cm    RV Free Wall Peak S' 13 cm/s    TAPSE 1.5 (A) 1.7 cm    TR Max Velocity 1.95 m/s    TR Peak Gradient 15 mmHg    Body Surface Area 1.42 m2    Fractional Shortening 2D 9 28 - 44 %    LV ESV Index A4C 43 mL/m2    LV EDV Index A4C 86 mL/m2    LV ESV Index A2C 26 mL/m2    LV EDV Index A2C 70 mL/m2    LVIDd Index 3.10 cm/m2    LVIDs Index 2.83 cm/m2    LV RWT Ratio 0.49     LV Mass 2D 164.0 (A) 67 - 162 g    LV Mass 2D Index 113.1 (A) 43 - 95 g/m2    MV E/A 0.64     E/E' Ratio (Averaged) 12.34     E/E' Lateral 10.29     E/E' Septal 14.40     LA Volume Index BP 31 16 - 34 ml/m2    LVOT Stroke Volume Index 37.9 mL/m2    LA Volume Index 2C 34 16 - 34 mL/m2    LA Volume Index 4C 28 16 - 34 mL/m2    LA Size Index 2.34 cm/m2    LA/AO Root Ratio 1.06     Ao Root Index 2.21 cm/m2    Ascending Aorta Index 2.41 cm/m2    AV Velocity Ratio 0.59     LVOT:AV VTI Index 0.68     RORY/BSA VTI 1.3 cm2/m2    RORY/BSA Peak Velocity 1.2 cm2/m2    RVSP 18 mmHg   Digoxin Level    Collection Time: 01/29/23 10:18 AM   Result Value Ref Range    Digoxin Lvl 1.9 0.90 - 2.10 NG/ML   Basic Metabolic Panel w/ Reflex to MG    Collection Time: 01/29/23 10:18 AM   Result Value Ref Range    Sodium 135 133 - 143 mmol/L    Potassium 4.2 3.5 - 5.1 mmol/L    Chloride 100 (L) 101 - 110 mmol/L    CO2 28 21 - 32 mmol/L    Anion Gap 7 2 - 11 mmol/L    Glucose 115 (H) 65 - 100 mg/dL    BUN 47 (H) 8 - 23 MG/DL    Creatinine 1.20 (H) 0.6 - 1.0 MG/DL    Est, Glom Filt Rate 46 (L) >60 ml/min/1.73m2    Calcium 8.4 8.3 - 10.4 MG/DL       I have personally reviewed imaging studies showing:  XR CHEST PORTABLE    Result Date: 1/29/2023  Portable chest xray  COMPARISON: none. INDICATION: chest pain FINDINGS: Heart is enlarged. Mediastinal contour is within normal limits. There are postsurgical changes of sternotomy. There is no focal pulmonary consolidation, pleural effusion or pneumothorax. No pulmonary edema. Bones are osteopenic. 1. No evidence of pneumonia or pulmonary edema. Echocardiogram:  No results found for this or any previous visit.         Signed:  Jordan Donaldson MD    Part of this note may have been written by using a voice dictation software. The note has been proof read but may still contain some grammatical/other typographical errors.

## 2023-01-30 LAB
INR PPP: >9
MAGNESIUM SERPL-MCNC: 2.1 MG/DL (ref 1.8–2.4)
MM INDURATION, POC: 0 MM (ref 0–5)
PPD, POC: NEGATIVE
PROTHROMBIN TIME: 86 SEC (ref 12.6–14.3)

## 2023-01-30 PROCEDURE — 97535 SELF CARE MNGMENT TRAINING: CPT

## 2023-01-30 PROCEDURE — 2580000003 HC RX 258: Performed by: FAMILY MEDICINE

## 2023-01-30 PROCEDURE — 97165 OT EVAL LOW COMPLEX 30 MIN: CPT

## 2023-01-30 PROCEDURE — 97530 THERAPEUTIC ACTIVITIES: CPT

## 2023-01-30 PROCEDURE — 83735 ASSAY OF MAGNESIUM: CPT

## 2023-01-30 PROCEDURE — 97161 PT EVAL LOW COMPLEX 20 MIN: CPT

## 2023-01-30 PROCEDURE — 6370000000 HC RX 637 (ALT 250 FOR IP): Performed by: FAMILY MEDICINE

## 2023-01-30 PROCEDURE — 2580000003 HC RX 258: Performed by: NURSE PRACTITIONER

## 2023-01-30 PROCEDURE — 6370000000 HC RX 637 (ALT 250 FOR IP): Performed by: NURSE PRACTITIONER

## 2023-01-30 PROCEDURE — 36415 COLL VENOUS BLD VENIPUNCTURE: CPT

## 2023-01-30 PROCEDURE — 85610 PROTHROMBIN TIME: CPT

## 2023-01-30 PROCEDURE — 97112 NEUROMUSCULAR REEDUCATION: CPT

## 2023-01-30 PROCEDURE — 94760 N-INVAS EAR/PLS OXIMETRY 1: CPT

## 2023-01-30 PROCEDURE — 1100000003 HC PRIVATE W/ TELEMETRY

## 2023-01-30 PROCEDURE — 94640 AIRWAY INHALATION TREATMENT: CPT

## 2023-01-30 RX ORDER — PHYTONADIONE 5 MG/1
5 TABLET ORAL ONCE
Status: COMPLETED | OUTPATIENT
Start: 2023-01-30 | End: 2023-01-30

## 2023-01-30 RX ORDER — 0.9 % SODIUM CHLORIDE 0.9 %
500 INTRAVENOUS SOLUTION INTRAVENOUS ONCE
Status: COMPLETED | OUTPATIENT
Start: 2023-01-30 | End: 2023-01-30

## 2023-01-30 RX ADMIN — FERROUS SULFATE TAB 325 MG (65 MG ELEMENTAL FE) 325 MG: 325 (65 FE) TAB at 08:52

## 2023-01-30 RX ADMIN — SODIUM CHLORIDE 500 ML: 9 INJECTION, SOLUTION INTRAVENOUS at 09:44

## 2023-01-30 RX ADMIN — MOMETASONE FUROATE AND FORMOTEROL FUMARATE DIHYDRATE 2 PUFF: 100; 5 AEROSOL RESPIRATORY (INHALATION) at 08:45

## 2023-01-30 RX ADMIN — VENLAFAXINE 37.5 MG: 25 TABLET ORAL at 08:52

## 2023-01-30 RX ADMIN — ASPIRIN 81 MG: 81 TABLET, CHEWABLE ORAL at 08:52

## 2023-01-30 RX ADMIN — MOMETASONE FUROATE AND FORMOTEROL FUMARATE DIHYDRATE 2 PUFF: 100; 5 AEROSOL RESPIRATORY (INHALATION) at 19:15

## 2023-01-30 RX ADMIN — SACUBITRIL AND VALSARTAN 1 TABLET: 24; 26 TABLET, FILM COATED ORAL at 20:25

## 2023-01-30 RX ADMIN — SODIUM CHLORIDE, PRESERVATIVE FREE 10 ML: 5 INJECTION INTRAVENOUS at 20:21

## 2023-01-30 RX ADMIN — SODIUM CHLORIDE 500 ML: 9 INJECTION, SOLUTION INTRAVENOUS at 01:25

## 2023-01-30 RX ADMIN — SODIUM CHLORIDE 500 ML: 9 INJECTION, SOLUTION INTRAVENOUS at 05:06

## 2023-01-30 RX ADMIN — PHYTONADIONE 5 MG: 5 TABLET ORAL at 09:48

## 2023-01-30 RX ADMIN — SODIUM CHLORIDE, PRESERVATIVE FREE 10 ML: 5 INJECTION INTRAVENOUS at 08:54

## 2023-01-30 RX ADMIN — ATORVASTATIN CALCIUM 40 MG: 40 TABLET, FILM COATED ORAL at 08:52

## 2023-01-30 ASSESSMENT — PAIN SCALES - GENERAL
PAINLEVEL_OUTOF10: 0

## 2023-01-30 NOTE — PROGRESS NOTES
ACUTE OCCUPATIONAL THERAPY GOALS:   (Developed with and agreed upon by patient and/or caregiver.)  1. Patient will complete lower body bathing and dressing with MOD I and adaptive equipment as needed. 2. Patient will complete toileting with MOD I.   3. Patient will tolerate 30 minutes of OT treatment with 1-2 rest breaks to increase activity tolerance for ADLs. 4. Patient will complete functional transfers with MOD I and adaptive equipment as needed. 5. Patient will complete functional mobility for household distances with MOD I and adaptive equipment as needed. 6. Patient will complete self-grooming while standing edge of sink with MOD I and adaptive equipment as needed. Timeframe: 7 visits       OCCUPATIONAL THERAPY Initial Assessment and Daily Note       OT Visit Days: 1   Acknowledge Orders  Time  OT Charge Capture  Rehab Caseload Tracker      Amanda Bedolla is a 65 y.o. female   PRIMARY DIAGNOSIS: Chest pain  Chest pain [R07.9]       Reason for Referral: Generalized Muscle Weakness (M62.81)  Inpatient: Payor: Marce Baer / Plan: MEDICARE PART A AND B / Product Type: *No Product type* /     ASSESSMENT:     REHAB RECOMMENDATIONS:   Recommendation to date pending progress:  Setting:  Home Health Therapy    Equipment:    To Be Determined     ASSESSMENT:  Ms. Nicole Kim presents with deficits in overall strength, activity tolerance, ADL performance, and functional mobility. Presents for chest pain, AMS, and weakness. Resting on RA. Very pleasant. BUE (4/5) are generally decreased but WFL. CGA for functional bed mobility/supine <> sit transfer to EOB; intact unsupported EOB sitting balance. Donned socks with set-up in preparation for OOB activity. CGA for sit <> stand; pt endorsing dizziness with standing and returned to sitting EOB to further assess vitals. BP dropping to 69/40 with a MAP of 57. Symptoms resolving with sitting.  Proceeded to complete self-grooming tasks, including washing face and brushing teeth, with set-up. Unable to mobilize today or further OOB activity. Returned to supine in bed. BP at 107/51. Limited by BP. At this time, Byron Leavitt is functioning below baseline for ADLs and functional mobility. Patient would benefit from skilled OT services to address OT goals and plan of care      [address] [facility] [address] [address] AM-PAC 6 Clicks Daily Activity Inpatient Short Form:    AM-PAC Daily Activity Inpatient   How much help for putting on and taking off regular lower body clothing?: A Little  How much help for Bathing?: A Little  How much help for Toileting?: A Little  How much help for putting on and taking off regular upper body clothing?: A Little  How much help for taking care of personal grooming?: A Little  How much help for eating meals?: A Little  AM-PAC Inpatient Daily Activity Raw Score: 18  AM-PAC Inpatient ADL T-Scale Score : 38.66  ADL Inpatient CMS 0-100% Score: 46.65  ADL Inpatient CMS G-Code Modifier : CK           SUBJECTIVE:     Ms. Eva Sylvester states, \"Thank you. \"     Social/Functional    Lives with  in 1-story home with 3 steps to enter. Independent at baseline for ADLs and MOD I for functional mobility with use of rollator.    OBJECTIVE:     Izell Blazing / Moffett Estrella / AIRWAY: None    RESTRICTIONS/PRECAUTIONS:       PAIN: VITALS / O2:   Pre Treatment:      0/10    Post Treatment: 0 /10       Vitals          Oxygen     Stable on RA       GROSS EVALUATION: INTACT IMPAIRED   (See Comments)   UE AROM [] []   UE PROM [] []   Strength []    Generally decreased; BUE WFL   Posture / Balance []    intact unsupported EOB sitting balance  Fair + static standing balance  Fair + dynamic standing balance   Sensation [x]     Coordination [x]       Tone [x]       Edema [x]    Activity Tolerance []       Hand Dominance R [] L []      COGNITION/  PERCEPTION: INTACT IMPAIRED   (See Comments)   Orientation [x]     Vision [x]     Hearing [x]     Cognition  [x]     Perception [x]       MOBILITY: I Mod I S SBA CGA Min Mod Max Total  NT x2 Comments:   Bed Mobility    Rolling [] [] [] [] [] [] [] [] [] [] []    Supine to Sit [] [] [] [] [x] [] [] [] [] [] []    Scooting [] [] [] [] [x] [] [] [] [] [] []    Sit to Supine [] [] [] [] [x] [] [] [] [] [] []    Transfers    Sit to Stand [] [] [] [] [x] [] [] [] [] [] []    Bed to Chair [] [] [] [] [] [] [] [] [] [] []    Stand to Sit [] [] [] [] [x] [] [] [] [] [] []    Tub/Shower [] [] [] [] [] [] [] [] [] [] []     Toilet [] [] [] [] [] [] [] [] [] [] []      [] [] [] [] [] [] [] [] [] [] []    I=Independent, Mod I=Modified Independent, S=Supervision/Setup, SBA=Standby Assistance, CGA=Contact Guard Assistance, Min=Minimal Assistance, Mod=Moderate Assistance, Max=Maximal Assistance, Total=Total Assistance, NT=Not Tested    ACTIVITIES OF DAILY LIVING: I Mod I S SBA CGA Min Mod Max Total NT Comments   BASIC ADLs:              Upper Body Bathing  [] [] [] [] [] [] [] [] [] []    Lower Body Bathing [] [] [] [] [] [] [] [] [] []    Toileting [] [] [] [] [] [] [] [] [] []    Upper Body Dressing [] [] [] [x] [] [] [] [] [] [] Donning gown   Lower Body Dressing [] [] [] [x] [] [] [] [] [] [] Donning socks   Feeding [] [] [] [] [] [] [] [] [] []    Grooming [] [] [] [x] [] [] [] [] [] [] Unsupported sitting   Personal Device Care [] [] [] [] [] [] [] [] [] []    Functional Mobility [] [] [] [] [x] [] [] [] [] []    I=Independent, Mod I=Modified Independent, S=Supervision/Setup, SBA=Standby Assistance, CGA=Contact Guard Assistance, Min=Minimal Assistance, Mod=Moderate Assistance, Max=Maximal Assistance, Total=Total Assistance, NT=Not Tested    PLAN:   FREQUENCY/DURATION   OT Plan of Care: 3 times/week for duration of hospital stay or until stated goals are met, whichever comes first.    PROBLEM LIST:   (Skilled intervention is medically necessary to address:)  Decreased ADL/Functional Activities  Decreased Activity Tolerance  Decreased AROM/PROM  Decreased Balance  Decreased Gait Ability  Decreased Safety Awareness  Decreased Strength  Decreased Transfer Abilities   INTERVENTIONS PLANNED:  (Benefits and precautions of occupational therapy have been discussed with the patient.)  Self Care Training  Therapeutic Activity  Therapeutic Exercise/HEP  Neuromuscular Re-education  Manual Therapy  Education         TREATMENT:     EVALUATION: LOW COMPLEXITY: (Untimed Charge)    TREATMENT:   Neuromuscular Re-education (10 Minutes): Patient participated in neuromuscular re-education including functional reaching, postural training, midline training, standing tolerance activity , and sitting balance activity   with minimal verbal cues to improve sitting balance, standing balance, posture, coordination, and functional mobility in order to prepare for functional task, prepare for seated ADLs, prepare for standing ADLs, and prepare for functional transfer. Self Care (13 minutes): Patient participated in upper body dressing, lower body dressing, and grooming ADLs in unsupported sitting with minimal verbal cueing to increase independence, decrease assistance required, and increase activity tolerance. Patient also participated in functional mobility, functional transfer, and energy conservation training to increase independence, decrease assistance required, increase activity tolerance, and increase safety awareness.      TREATMENT GRID:  N/A    AFTER TREATMENT PRECAUTIONS: Bed, Call light within reach, Needs within reach, and RN notified    INTERDISCIPLINARY COLLABORATION:  RN/ PCT, PT/ PTA, and OT/ SANDOVAL    EDUCATION:  Education Given To: Patient  Education Provided: Role of Therapy;Plan of Care  Barriers to Learning: None  Education Outcome: Verbalized understanding;Demonstrated understanding     TOTAL TREATMENT DURATION AND TIME:  Time In: [address] [facility]  Time Out: [address] [place] [address]  Minutes: [address], OT

## 2023-01-30 NOTE — PROGRESS NOTES
ACUTE PHYSICAL THERAPY GOALS:   (Developed with and agreed upon by patient and/or caregiver. )  LTG:  (1.)Ms. Shahzad Mendoza will move from supine to sit and sit to supine , scoot up and down, and roll side to side in bed with INDEPENDENCE within 7 treatment day(s). (2.)Ms. Shahzad Mendoza will transfer from bed to chair and chair to bed with INDEPENDENCE using the least restrictive device within 7 treatment day(s). (3.)Ms. Shahzad Mendoza will ambulate with MODIFIED INDEPENDENCE for 500 feet with the least restrictive device within 7 treatment day(s). (4.)Ms. Shahzad Mendoza will participate in therapeutic activity/exercises x 25 minutes for increased activity tolerance within 7 treatment days. (5.)Ms. Shahzad Mendoza will perform standing static and dynamic balance activities x 15 minutes with SUPERVISION to improve safety within 7 day(s). ________________________________________________________________________________________________        PHYSICAL THERAPY Initial Assessment and AM  (Link to Caseload Tracking: PT Visit Days : 1  Acknowledge Orders  Time In/Out  PT Charge Capture  Rehab Caseload Tracker    Ruba Sauceda is a 65 y.o. female   PRIMARY DIAGNOSIS: Chest pain  Chest pain [R07.9]       Reason for Referral: Generalized Muscle Weakness (M62.81)  Difficulty in walking, Not elsewhere classified (R26.2)  Inpatient: Payor: MEDICARE / Plan: MEDICARE PART A AND B / Product Type: *No Product type* /     ASSESSMENT:     REHAB RECOMMENDATIONS:   Recommendation to date pending progress:  [facility]    To Be Determined     ASSESSMENT:  Ms. Shahzad Mendoza presents to PT with [facility] AROM and decreased strength in B LEs. Pt was able to come to sitting on EOB with Jesus and good sitting balance. She was able to stand today with Jesus but reported dizziness. Pt's BP checked in sitting at 102/60 and then in standing at 69/40. Pt tolerated several EOB activities today before getting back in bed.   RN alerted of orthostatic hypotension. Ms. Stephen Zhang could benefit from skilled PT as she is currently functioning below her baseline.        [address] [facility] [address] [address] [facility]licks Basic Mobility Inpatient Short Form  AM-PAC Mobility Inpatient   How much difficulty turning over in bed?: None  How much difficulty sitting down on / standing up from a chair with arms?: A Little  How much difficulty moving from lying on back to sitting on side of bed?: A Little  How much help from another person moving to and from a bed to a chair?: A Little  How much help from another person needed to walk in hospital room?: A Little  How much help from another person for climbing 3-5 steps with a railing?: A Little  AM-[facility] Inpatient Mobility Raw Score : 19  AM-PAC Inpatient T-Scale Score : 45.44  Mobility Inpatient CMS 0-100% Score: 41.77  Mobility Inpatient CMS G-Code Modifier : CK    SUBJECTIVE:   Ms. Stephen Zhang states, \"Comfy, cozy\"     Social/Functional    Lives with spouse and uses RW for ambulation  OBJECTIVE:     PAIN: Seferino Riosk / O2: PRECAUTION / Ki Palomino / Levy Clearysta:   Pre Treatment:   Pain Assessment: None - Denies Pain      Post Treatment: 0 Vitals        Oxygen      Telemetry     RESTRICTIONS/PRECAUTIONS:                    GROSS EVALUATION: Intact Impaired (Comments):   AROM [x]     PROM []    Strength []  Generalized weakness   Balance []  Fair standing   Posture [] N/A   Sensation []     Coordination []      Tone []     Edema []    Activity Tolerance []  Generally decreased    []      COGNITION/  PERCEPTION: Intact Impaired (Comments):   Orientation [x]     Vision []     Hearing [x]     Cognition  [x]       MOBILITY: I Mod I S SBA CGA Min Mod Max Total  NT x2 Comments:   Bed Mobility    Rolling [] [] [] [x] [] [] [] [] [] [] []    Supine to Sit [] [] [] [] [] [x] [] [] [] [] []    Scooting [] [] [] [] [] [x] [] [] [] [] []    Sit to Supine [] [] [] [] [] [x] [] [] [] [] []    Transfers    Sit to Stand [] [] [] [] [] [x] [] [] [] [] []    Bed to Chair [] [] [] [] [] [x] [] [] [] [] []    Stand to Sit [] [] [] [] [] [x] [] [] [] [] []     [] [] [] [] [] [] [] [] [] [] []    I=Independent, Mod I=Modified Independent, S=Supervision, SBA=Standby Assistance, CGA=Contact Guard Assistance,   Min=Minimal Assistance, Mod=Moderate Assistance, Max=Maximal Assistance, Total=Total Assistance, NT=Not Tested    GAIT: I Mod I S SBA CGA Min Mod Max Total  NT x2 Comments:   Level of Assistance [] [] [] [] [] [] [] [] [] [x] []    Distance   feet    DME N/A    Gait Quality N/A    Weightbearing Status      Stairs      I=Independent, Mod I=Modified Independent, S=Supervision, SBA=Standby Assistance, CGA=Contact Guard Assistance,   Min=Minimal Assistance, Mod=Moderate Assistance, Max=Maximal Assistance, Total=Total Assistance, NT=Not Tested    PLAN:   FREQUENCY AND DURATION: 3 times/week for duration of hospital stay or until stated goals are met, whichever comes first.    THERAPY PROGNOSIS: Good    PROBLEM LIST:   (Skilled intervention is medically necessary to address:)  Decreased Activity Tolerance  Decreased Balance  Decreased Gait Ability  Decreased Strength  Decreased Transfer Abilities INTERVENTIONS PLANNED:   (Benefits and precautions of physical therapy have been discussed with the patient.)  Self Care Training  Therapeutic Activity  Therapeutic Exercise/HEP  Neuromuscular Re-education  Gait Training  Education       TREATMENT:   EVALUATION: LOW COMPLEXITY: (Untimed Charge)    TREATMENT:   Co-Treatment PT/OT necessary due to patient's decreased overall endurance/tolerance levels, as well as need for high level skilled assistance to complete functional transfers/mobility and functional tasks  Therapeutic Activity (23 Minutes): Therapeutic activity included Rolling, Supine to Sit, Sit to Supine, Scooting, Transfer Training, Sitting balance , and Standing balance to improve functional Activity tolerance, Balance, Mobility, and Strength.     TREATMENT GRID:  N/A    AFTER TREATMENT PRECAUTIONS: Bed, Bed/Chair Locked, Call light within reach, Needs within reach, and RN notified    INTERDISCIPLINARY COLLABORATION:  RN/ PCT, PT/ PTA, and OT/ SANDOVAL    EDUCATION: Education Given To: Patient  Education Provided: Role of Therapy    TIME IN/OUT:  Time In: 0929  Time Out: 6468  Minutes: Girma. Darlin [address].  [facility]

## 2023-01-30 NOTE — CARE COORDINATION
Patient admitted with chest pain.  CM met with patient to assess for discharge needs. Patient verified PCP, demographic and insurance. Patient lives with her spouse in a house. Patient reports she is independent of ADLs and drives herself to appointments. DME: RW from previous Ortho surgery. Not in use at this time.  History of STR in [place], [place] after surgery and home health. Patient unsure of home health agency.  CM asked patient how she did today working with therapy. Patient reports she was up but not with therapy. CM reviewed clinical notes and therapy recommending home health. CM will follow up with patient on therapy recommendation.   Spouse to transport home at discharge.      01/30/23 1506   Service Assessment   Patient Orientation Alert and Oriented   Cognition Alert   History Provided By Patient   Primary Caregiver Self   Support Systems Spouse/Significant Other   Patient's Healthcare Decision Maker is: Legal Next of Kin   PCP Verified by CM Yes  (Dr Carreon)   Last Visit to PCP Within last 6 months   Prior Functional Level Independent in ADLs/IADLs   Current Functional Level Independent in ADLs/IADLs   Can patient return to prior living arrangement Yes   Ability to make needs known: Good   Family able to assist with home care needs: Yes   Would you like for me to discuss the discharge plan with any other family members/significant others, and if so, who? No   Financial Resources Medicare   Social/Functional History   Lives With Spouse   Type of Home House   Home Equipment Walker, rolling  (RW not in use)   Receives Help From Family   ADL Assistance Independent   Homemaking Assistance Independent   Ambulation Assistance Independent   Transfer Assistance Independent   Active  Yes   Mode of Transportation Car   Discharge Planning   Type of Residence House   Living Arrangements Spouse/Significant Other   Current Services Prior To Admission None   Potential Assistance Needed N/A   DME Ordered? No  Potential Assistance Purchasing Medications No   Type of Home Care Services None   Patient expects to be discharged to: [facility] Dis[facility]   [address] Provided? No   Mode of Transport at Discharge   (Car)   Confirm Follow Up Transport Family   Condition of Participation: Discharge Planning   The Plan for Transition of Care is related to the following treatment goals: Home with family assistance   The Patient and/Or Patient Representative agree with the Discharge Plan?  Yes

## 2023-01-30 NOTE — PROGRESS NOTES
Hospitalist Progress Note   Admit Date:  2023  3:28 AM   Name:  Reyna Mccall   Age:  65 y.o. Sex:  female  :  1945   MRN:  [id number]   Room:  [facility]/01    Presenting Complaint: No chief complaint on file. Reason(s) for Admission: Chest pain [R07.9]     Hospital Course:   Reyna Mccall is a 65 y.o. female with history of CAD, CABG, pAFIB, dementia and CHF who presented with chest pain. Patient was initially admitted the cardiology service for chest pain evaluation. After review of the records and lab results it was determined that she not in ACS. Subjective & 24hr Events (23): Patient is seen at the bedside. Reports she is feeling a lot better. Denies chest pain, shortness of breath, nausea, vomiting, abdominal pain. Wants to go home. Assessment & Plan:     Chest pain:   ACS ruled out as troponin are not significantly elevated and patient actually had upper extremity pain. Cardiology has reviewed her ECG and found no evidence of Cardiac ischemia. Cardiology has s/o and transferred care to the hospitalist service for further management. Supratherapuetic INR:   Inr > 9, no evidence of bleeding  Hold Coumadin  Vitamin K p.o. 5 mg once     Pafib:   CAD/HTN:   continue GDMT  holding coumadin  continue Metoprolol     Congestive Heart Failure:    reports EF of 15%. Will await echocardiogram results. Appears compensated     Dementia:   aware     Hyponatremia:   resolved. Seen on PTA labs sent in transfer     HLD:   Continue statin     Weakness:   she will be evaluated by PT/OT and have ppd placed      Anticipated discharge needs: To be determined    Diet:  ADULT DIET; Regular; 4 carb choices (60 gm/meal); Low Fat/Low Chol/High Fiber/2 gm Na;  No Caffeine, No red dye  DVT PPx: Supratherapeutic INR  Code status: Full Code    Hospital Problems:  Principal Problem:    Chest pain  Active Problems:    Weakness    HLD (hyperlipidemia)    CAD (coronary artery disease) S/P CABG x 3    PAF (paroxysmal atrial fibrillation) (HCC)    Warfarin anticoagulation    Supratherapeutic INR    Confusion    CHF (congestive heart failure) ([facility] [address]  Resolved Problems:    * No resolved hospital problems. *      Objective:   Patient Vitals for the past 24 hrs:   Temp Pulse Resp BP SpO2   01/30/23 1154 -- -- -- (!) 110/51 --   01/30/23 1153 97.8 °F (36.6 °C) 78 16 (!) 93/45 95 %   01/30/23 0845 -- 73 15 -- 96 %   01/30/23 0759 98.6 °F (37 °C) 72 14 (!) 88/51 95 %   01/30/23 0556 -- -- -- (!) 90/49 --   01/30/23 0423 98.1 °F (36.7 °C) 79 15 (!) 86/50 94 %   01/30/23 0232 -- 80 -- (!) 90/52 --   01/30/23 0047 -- -- -- (!) 89/52 --   01/29/23 2358 98.1 °F (36.7 °C) 89 15 (!) 91/41 94 %   01/29/23 2052 -- 78 16 -- 97 %   01/29/23 2012 97.7 °F (36.5 °C) 91 16 (!) 102/51 96 %   01/29/23 1712 97.3 °F (36.3 °C) 69 19 (!) 94/57 98 %   01/29/23 1330 -- -- -- (!) 93/46 --       Oxygen Therapy  SpO2: 95 %  Pulse Oximeter Device Mode: Intermittent  O2 Device: None (Room air)    Estimated body mass index is 16.62 kg/m² as calculated from the following:    Height as of this encounter: 5' 4\" (1.626 m). Weight as of this encounter: 96 lb 12.8 oz (43.9 kg). Intake/Output Summary (Last 24 hours) at 1/30/2023 1252  Last data filed at 1/30/2023 0506  Gross per 24 hour   Intake --   Output 700 ml   Net -700 ml         Physical Exam:     Blood pressure (!) 110/51, pulse 78, temperature 97.8 °F (36.6 °C), resp. rate 16, height 5' 4\" (1.626 m), weight 96 lb 12.8 oz (43.9 kg), SpO2 95 %. General:    Well nourished. Head:  Normocephalic, atraumatic  Eyes:  Sclerae appear normal.  Pupils equally round. ENT:  Nares appear normal.  Moist oral mucosa  Neck:  No restricted ROM. Trachea midline   CV:   RRR. No m/r/g. No jugular venous distension. Lungs:   CTAB. No wheezing, rhonchi, or rales. Symmetric expansion. Abdomen:   Soft, nontender, nondistended. Extremities: No cyanosis or clubbing.   No edema  Skin:     No rashes and normal coloration. Warm and dry. Neuro:  CN II-XII grossly intact. Psych:  Normal mood and affect.       I have personally reviewed labs and tests:  Recent Labs:  Recent Results (from the past 48 hour(s))   CBC    Collection Time: 01/29/23  5:07 AM   Result Value Ref Range    WBC 5.7 4.3 - 11.1 K/uL    RBC 3.12 (L) 4.05 - 5.2 M/uL    Hemoglobin 8.9 (L) 11.7 - 15.4 g/dL    Hematocrit 27.7 (L) 35.8 - 46.3 %    MCV 88.8 82 - 102 FL    MCH 28.5 26.1 - 32.9 PG    MCHC 32.1 31.4 - 35.0 g/dL    RDW 20.3 (H) 11.9 - 14.6 %    Platelets 618 808 - 540 K/uL    MPV 10.5 9.4 - 12.3 FL    nRBC 0.00 0.0 - 0.2 K/uL   Hemoglobin A1c    Collection Time: 01/29/23  5:07 AM   Result Value Ref Range    Hemoglobin A1C 5.4 4.8 - 5.6 %    eAG 108 mg/dL   Lipid Panel    Collection Time: 01/29/23  5:07 AM   Result Value Ref Range    Cholesterol, Total 103 <200 MG/DL    Triglycerides 57 35 - 150 MG/DL    HDL 46 40 - 60 MG/DL    LDL Calculated 45.6 <100 MG/DL    VLDL Cholesterol Calculated 11.4 6.0 - 23.0 MG/DL    Chol/HDL Ratio 2.2     Magnesium    Collection Time: 01/29/23  5:07 AM   Result Value Ref Range    Magnesium 2.3 1.8 - 2.4 mg/dL   Protime-INR    Collection Time: 01/29/23  5:07 AM   Result Value Ref Range    Protime 66.9 (H) 12.6 - 14.3 sec    INR 7.8 (HH)     TSH with Reflex    Collection Time: 01/29/23  5:07 AM   Result Value Ref Range    TSH w Free Thyroid if Abnormal 4.50 (H) 0.358 - 3.740 UIU/ML   Lactic Acid    Collection Time: 01/29/23  5:07 AM   Result Value Ref Range    Lactic Acid, Plasma 1.1 0.4 - 2.0 MMOL/L   Procalcitonin    Collection Time: 01/29/23  5:07 AM   Result Value Ref Range    Procalcitonin <0.05 0.00 - 0.49 ng/mL   POCT troponin    Collection Time: 01/29/23  5:07 AM   Result Value Ref Range    POC Troponin I 0.06 (H) 0.02 - 0.05 ng/ml   T4, Free    Collection Time: 01/29/23  5:07 AM   Result Value Ref Range    T4 Free 1.3 0.78 - 1.46 NG/DL   TYPE AND SCREEN    Collection Time: 01/29/23  7:20 AM   Result Value Ref Range    Crossmatch expiration date 02/01/2023,2359     ABO/Rh O POSITIVE     Antibody Screen NEG    POCT troponin    Collection Time: 01/29/23  7:22 AM   Result Value Ref Range    POC Troponin I 0.03 0.02 - 0.05 ng/ml   Transthoracic echocardiogram (TTE) complete with contrast, bubble, strain, and 3D PRN    Collection Time: 01/29/23  9:55 AM   Result Value Ref Range    LV EDV A2C 102 mL    LV EDV A4C 125 mL    LV ESV A2C 38 mL    LV ESV A4C 62 mL    IVSd 1.0 (A) 0.6 - 0.9 cm    LVIDd 4.5 3.9 - 5.3 cm    LVIDs 4.1 cm    LVOT Diameter 1.9 cm    LVOT Mean Gradient 1 mmHg    LVOT VTI 19.4 cm    LVOT Peak Velocity 1.0 m/s    LVOT Peak Gradient 4 mmHg    LVPWd 1.1 (A) 0.6 - 0.9 cm    LV E' Lateral Velocity 7 cm/s    LV E' Septal Velocity 5 cm/s    LV Ejection Fraction A2C 63 %    LV Ejection Fraction A4C 50 %    EF BP 57 55 - 100 %    LVOT Area 2.8 cm2    LVOT SV 55.0 ml    LA Minor Axis 5.0 cm    LA Major Axis 5.0 cm    LA Area 2C 17.4 cm2    LA Area 4C 16.1 cm2    LA Volume 2C 49 22 - 52 mL    LA Volume 4C 41 22 - 52 mL    LA Volume BP 45 22 - 52 mL    LA Diameter 3.4 cm    AV Mean Velocity 1.2 m/s    AV Mean Gradient 6 mmHg    AV VTI 28.6 cm    AV Peak Velocity 1.7 m/s    AV Peak Gradient 12 mmHg    AV Area by VTI 1.9 cm2    AV Area by Peak Velocity 1.7 cm2    Aortic Root 3.2 cm    Ascending Aorta 3.5 cm    IVC Proxmal 0.9 cm    MV E Wave Deceleration Time 266.0 ms    MV A Velocity 1.13 m/s    MV E Velocity 0.72 m/s    PV Max Velocity 0.8 m/s    PV Peak Gradient 3 mmHg    Est. RA Pressure 3 mmHg    RV Basal Dimension 3.6 cm    RV Free Wall Peak S' 13 cm/s    TAPSE 1.5 (A) 1.7 cm    TR Max Velocity 1.95 m/s    TR Peak Gradient 15 mmHg    Body Surface Area 1.42 m2    Fractional Shortening 2D 9 28 - 44 %    LV ESV Index A4C 43 mL/m2    LV EDV Index A4C 86 mL/m2    LV ESV Index A2C 26 mL/m2    LV EDV Index A2C 70 mL/m2    LVIDd Index 3.10 cm/m2    LVIDs Index 2.83 cm/m2    LV RWT Ratio 0.49     LV Mass 2D 164.0 (A) 67 - 162 g    LV Mass 2D Index 113.1 (A) 43 - 95 g/m2    MV E/A 0.64     E/E' Ratio (Averaged) 12.34     E/E' Lateral 10.29     E/E' Septal 14.40     LA Volume Index BP 31 16 - 34 ml/m2    LVOT Stroke Volume Index 37.9 mL/m2    LA Volume Index 2C 34 16 - 34 mL/m2    LA Volume Index 4C 28 16 - 34 mL/m2    LA Size Index 2.34 cm/m2    LA/AO Root Ratio 1.06     Ao Root Index 2.21 cm/m2    Ascending Aorta Index 2.41 cm/m2    AV Velocity Ratio 0.59     LVOT:AV VTI Index 0.68     RORY/BSA VTI 1.3 cm2/m2    RORY/BSA Peak Velocity 1.2 cm2/m2    RVSP 18 mmHg    Left Ventricular Ejection Fraction 53     LVEF MODALITY ECHO    Digoxin Level    Collection Time: 01/29/23 10:18 AM   Result Value Ref Range    Digoxin Lvl 1.9 0.90 - 2.10 NG/ML   Basic Metabolic Panel w/ Reflex to MG    Collection Time: 01/29/23 10:18 AM   Result Value Ref Range    Sodium 135 133 - 143 mmol/L    Potassium 4.2 3.5 - 5.1 mmol/L    Chloride 100 (L) 101 - 110 mmol/L    CO2 28 21 - 32 mmol/L    Anion Gap 7 2 - 11 mmol/L    Glucose 115 (H) 65 - 100 mg/dL    BUN 47 (H) 8 - 23 MG/DL    Creatinine 1.20 (H) 0.6 - 1.0 MG/DL    Est, Glom Filt Rate 46 (L) >60 ml/min/1.73m2    Calcium 8.4 8.3 - 10.4 MG/DL   POCT troponin    Collection Time: 01/29/23 10:18 AM   Result Value Ref Range    POC Troponin I 0.03 0.02 - 0.05 ng/ml   Urinalysis with Reflex to Culture    Collection Time: 01/29/23 12:13 PM    Specimen: Urine   Result Value Ref Range    Color, UA YELLOW/STRAW      Appearance CLEAR      Specific Gravity, UA 1.020 1.001 - 1.023      pH, Urine 5.0 5.0 - 9.0      Protein, UA Negative NEG mg/dL    Glucose, UA Negative mg/dL    Ketones, Urine Negative NEG mg/dL    Bilirubin Urine Negative NEG      Blood, Urine Negative NEG      Urobilinogen, Urine 0.2 0.2 - 1.0 EU/dL    Nitrite, Urine Negative NEG      Leukocyte Esterase, Urine Negative NEG      WBC, UA 0-3 0 /hpf    RBC, UA 0 0 /hpf    BACTERIA, URINE TRACE 0 /hpf    Urine Culture if Indicated  CULTURE NOT INDICATED BY UA RESULT      Epithelial Cells UA 0-3 0 /hpf    Casts 0 0 /lpf    Crystals 0 0 /LPF    Mucus, UA 0 0 /lpf   Magnesium    Collection Time: 01/30/23  5:36 AM   Result Value Ref Range    Magnesium 2.1 1.8 - 2.4 mg/dL   Protime-INR    Collection Time: 01/30/23  5:36 AM   Result Value Ref Range    Protime 86.0 (H) 12.6 - 14.3 sec    INR >9.0 (HH)        I have personally reviewed imaging studies:  Other Studies:  XR CHEST PORTABLE   Final Result      1. No evidence of pneumonia or pulmonary edema.              Current Meds:  Current Facility-Administered Medications   Medication Dose Route Frequency    sodium chloride flush 0.9 % injection 5-40 mL  5-40 mL IntraVENous 2 times per day    sodium chloride flush 0.9 % injection 5-40 mL  5-40 mL IntraVENous PRN    0.9 % sodium chloride infusion   IntraVENous PRN    ondansetron (ZOFRAN-ODT) disintegrating tablet 4 mg  4 mg Oral Q8H PRN    Or    ondansetron (ZOFRAN) injection 4 mg  4 mg IntraVENous Q6H PRN    acetaminophen (TYLENOL) tablet 650 mg  650 mg Oral Q6H PRN    Or    acetaminophen (TYLENOL) suppository 650 mg  650 mg Rectal Q6H PRN    polyethylene glycol (GLYCOLAX) packet 17 g  17 g Oral Daily PRN    aspirin chewable tablet 81 mg  81 mg Oral Daily    potassium chloride (KLOR-CON M) extended release tablet 40 mEq  40 mEq Oral PRN    Or    potassium bicarb-citric acid (EFFER-K) effervescent tablet 40 mEq  40 mEq Oral PRN    Or    potassium chloride 10 mEq/100 mL IVPB (Peripheral Line)  10 mEq IntraVENous PRN    magnesium sulfate 2000 mg in 50 mL IVPB premix  2,000 mg IntraVENous PRN    aluminum & magnesium hydroxide-simethicone (MAALOX) 200-200-20 MG/5ML suspension 30 mL  30 mL Oral Q6H PRN    nitroGLYCERIN (NITROSTAT) SL tablet 0.4 mg  0.4 mg SubLINGual Q5 Min PRN    morphine (PF) injection 2 mg  2 mg IntraVENous Q2H PRN    Or    morphine injection 4 mg  4 mg IntraVENous Q2H PRN    atorvastatin (LIPITOR) tablet 40 mg  40 mg Oral Daily    ferrous sulfate (IRON 325) tablet 325 mg  325 mg Oral Daily with breakfast    mometasone-formoterol (DULERA) 100-5 MCG/ACT inhaler 2 puff  2 puff Inhalation BID    metoprolol succinate (TOPROL XL) extended release tablet 25 mg  25 mg Oral Daily    sacubitril-valsartan (ENTRESTO) 24-26 MG per tablet 1 tablet  1 tablet Oral BID    venlafaxine (EFFEXOR) tablet 37.5 mg  37.5 mg Oral Daily    traMADol (ULTRAM) tablet 50 mg  50 mg Oral Q6H PRN    tuberculin injection 5 Units  5 Units IntraDERmal Once       Signed:  Thomas Duron MD    Part of this note may have been written by using a voice dictation software. The note has been proof read but may still contain some grammatical/other typographical errors.

## 2023-01-31 LAB
ANION GAP SERPL CALC-SCNC: 6 MMOL/L (ref 2–11)
BUN SERPL-MCNC: 26 MG/DL (ref 8–23)
CALCIUM SERPL-MCNC: 8.9 MG/DL (ref 8.3–10.4)
CHLORIDE SERPL-SCNC: 99 MMOL/L (ref 101–110)
CO2 SERPL-SCNC: 27 MMOL/L (ref 21–32)
CREAT SERPL-MCNC: 0.9 MG/DL (ref 0.6–1)
ERYTHROCYTE [DISTWIDTH] IN BLOOD BY AUTOMATED COUNT: 19.8 % (ref 11.9–14.6)
FERRITIN SERPL-MCNC: 20 NG/ML (ref 8–388)
FOLATE SERPL-MCNC: 10.3 NG/ML (ref 3.1–17.5)
GLUCOSE SERPL-MCNC: 90 MG/DL (ref 65–100)
HCT VFR BLD AUTO: 18.2 % (ref 35.8–46.3)
HCT VFR BLD AUTO: 19 % (ref 35.8–46.3)
HCT VFR BLD AUTO: 27 % (ref 35.8–46.3)
HGB BLD-MCNC: 5.9 G/DL (ref 11.7–15.4)
HGB BLD-MCNC: 6.1 G/DL (ref 11.7–15.4)
HGB BLD-MCNC: 8.9 G/DL (ref 11.7–15.4)
HISTORY CHECK: NORMAL
INR PPP: 1.6
IRON SERPL-MCNC: 164 UG/DL (ref 35–150)
LDH SERPL L TO P-CCNC: 302 U/L (ref 110–210)
MAGNESIUM SERPL-MCNC: 2.2 MG/DL (ref 1.8–2.4)
MCH RBC QN AUTO: 29.2 PG (ref 26.1–32.9)
MCHC RBC AUTO-ENTMCNC: 32.4 G/DL (ref 31.4–35)
MCV RBC AUTO: 90.1 FL (ref 82–102)
MM INDURATION, POC: 0 MM (ref 0–5)
NRBC # BLD: 0 K/UL (ref 0–0.2)
PLATELET # BLD AUTO: 210 K/UL (ref 150–450)
PMV BLD AUTO: 10.2 FL (ref 9.4–12.3)
POTASSIUM SERPL-SCNC: 4.8 MMOL/L (ref 3.5–5.1)
PPD, POC: NEGATIVE
PROTHROMBIN TIME: 19.7 SEC (ref 12.6–14.3)
RBC # BLD AUTO: 2.02 M/UL (ref 4.05–5.2)
SODIUM SERPL-SCNC: 132 MMOL/L (ref 133–143)
VIT B12 SERPL-MCNC: 258 PG/ML (ref 193–986)
WBC # BLD AUTO: 6.5 K/UL (ref 4.3–11.1)

## 2023-01-31 PROCEDURE — 94760 N-INVAS EAR/PLS OXIMETRY 1: CPT

## 2023-01-31 PROCEDURE — 94640 AIRWAY INHALATION TREATMENT: CPT

## 2023-01-31 PROCEDURE — 83010 ASSAY OF HAPTOGLOBIN QUANT: CPT

## 2023-01-31 PROCEDURE — P9016 RBC LEUKOCYTES REDUCED: HCPCS

## 2023-01-31 PROCEDURE — 83540 ASSAY OF IRON: CPT

## 2023-01-31 PROCEDURE — 83615 LACTATE (LD) (LDH) ENZYME: CPT

## 2023-01-31 PROCEDURE — 36430 TRANSFUSION BLD/BLD COMPNT: CPT

## 2023-01-31 PROCEDURE — 85027 COMPLETE CBC AUTOMATED: CPT

## 2023-01-31 PROCEDURE — 94664 DEMO&/EVAL PT USE INHALER: CPT

## 2023-01-31 PROCEDURE — 82728 ASSAY OF FERRITIN: CPT

## 2023-01-31 PROCEDURE — 2580000003 HC RX 258: Performed by: NURSE PRACTITIONER

## 2023-01-31 PROCEDURE — 1100000003 HC PRIVATE W/ TELEMETRY

## 2023-01-31 PROCEDURE — 85018 HEMOGLOBIN: CPT

## 2023-01-31 PROCEDURE — 82746 ASSAY OF FOLIC ACID SERUM: CPT

## 2023-01-31 PROCEDURE — 36415 COLL VENOUS BLD VENIPUNCTURE: CPT

## 2023-01-31 PROCEDURE — 80048 BASIC METABOLIC PNL TOTAL CA: CPT

## 2023-01-31 PROCEDURE — 82607 VITAMIN B-12: CPT

## 2023-01-31 PROCEDURE — 83735 ASSAY OF MAGNESIUM: CPT

## 2023-01-31 PROCEDURE — 6370000000 HC RX 637 (ALT 250 FOR IP): Performed by: NURSE PRACTITIONER

## 2023-01-31 PROCEDURE — 98960 EDU&TRN PT SELF-MGMT NQHP 1: CPT

## 2023-01-31 PROCEDURE — 97530 THERAPEUTIC ACTIVITIES: CPT

## 2023-01-31 PROCEDURE — 85610 PROTHROMBIN TIME: CPT

## 2023-01-31 RX ORDER — SODIUM CHLORIDE 9 MG/ML
INJECTION, SOLUTION INTRAVENOUS PRN
Status: DISCONTINUED | OUTPATIENT
Start: 2023-01-31 | End: 2023-02-02 | Stop reason: HOSPADM

## 2023-01-31 RX ADMIN — MOMETASONE FUROATE AND FORMOTEROL FUMARATE DIHYDRATE 2 PUFF: 100; 5 AEROSOL RESPIRATORY (INHALATION) at 08:07

## 2023-01-31 RX ADMIN — ASPIRIN 81 MG: 81 TABLET, CHEWABLE ORAL at 08:55

## 2023-01-31 RX ADMIN — VENLAFAXINE 37.5 MG: 25 TABLET ORAL at 09:02

## 2023-01-31 RX ADMIN — ATORVASTATIN CALCIUM 40 MG: 40 TABLET, FILM COATED ORAL at 08:55

## 2023-01-31 RX ADMIN — SACUBITRIL AND VALSARTAN 1 TABLET: 24; 26 TABLET, FILM COATED ORAL at 20:16

## 2023-01-31 RX ADMIN — FERROUS SULFATE TAB 325 MG (65 MG ELEMENTAL FE) 325 MG: 325 (65 FE) TAB at 08:55

## 2023-01-31 RX ADMIN — MOMETASONE FUROATE AND FORMOTEROL FUMARATE DIHYDRATE 2 PUFF: 100; 5 AEROSOL RESPIRATORY (INHALATION) at 20:47

## 2023-01-31 RX ADMIN — METOPROLOL SUCCINATE 25 MG: 25 TABLET, EXTENDED RELEASE ORAL at 08:55

## 2023-01-31 RX ADMIN — SODIUM CHLORIDE, PRESERVATIVE FREE 10 ML: 5 INJECTION INTRAVENOUS at 20:16

## 2023-01-31 RX ADMIN — SACUBITRIL AND VALSARTAN 1 TABLET: 24; 26 TABLET, FILM COATED ORAL at 08:55

## 2023-01-31 ASSESSMENT — PAIN SCALES - GENERAL
PAINLEVEL_OUTOF10: 0
PAINLEVEL_OUTOF10: 0

## 2023-01-31 NOTE — PROGRESS NOTES
Hospitalist Progress Note   Admit Date:  2023  3:28 AM   Name:  Alma Garvey   Age:  77 y.o.  Sex:  female  :  1945   MRN:  [id number]   Room:  [facility][note 3]/01    Presenting Complaint: No chief complaint on file.     Reason(s) for Admission: Chest pain [R07.9]     Hospital Course:   Alma Garvey is a 77 y.o. female with history of CAD, CABG, pAFIB, dementia and CHF who presented with chest pain.  Patient was initially admitted the cardiology service for chest pain evaluation.  After review of the records and lab results it was determined that she not in ACS.      Patient also noted with supratherapeutic INR > 9.  Warfarin held.  No evidence of active bleeding.  Patient received vitamin K p.o. 5 mg once on , follow-up INR 1.6.  Hemoglobin dropped to 5.9 on  from 8.9 on . 2 units PRBC ordered.  GI consulted to rule out source of anemia.    Subjective & 24hr Events (23):   Patient is seen at the bedside.  Reports she is feeling okay.  No active complaint.  Denies chest pain, palpitation, nausea, vomiting or abdominal pain.  Had bowel movement yesterday, denies hematochezia or melena.    Assessment & Plan:     Acute normocytic anemia:  Hemoglobin dropped to 5.9 on  from 8.9 on   No active signs of bleeding  Warfarin on hold  Iron panel, folic acid, vitamin B12, peripheral blood smear  GI consulted, appreciate recommendation  Transfuse 2 unit PRBC  Monitor H&H    Chest pain:   ACS ruled out as troponin are not significantly elevated and patient actually had upper extremity pain.  Cardiology has reviewed her ECG and found no evidence of Cardiac ischemia.  Cardiology has s/o and transferred care to the hospitalist service for further management.     Supratherapuetic INR:   Inr > 9, no evidence of bleeding  Vitamin K p.o. 5 mg once   INR 1.6 on   Continue to monitor  Continue to hold warfarin in setting of anemia     Pafib:   CAD/HTN:   Congestive Heart Failure:   continue  GDMT  holding coumadin  continue Metoprolol  Echocardiogram with ejection fraction 50 to 55% and normal diastolic function     Dementia:   aware     Hyponatremia:   resolved. Seen on PTA labs sent in transfer     HLD:   Continue statin      Anticipated discharge needs: Home health on discharge    Additional Medical Decision Making factors:  Complexity: 1 or more acute or chronic illness with exacerbation that poses a threat to life, organ, or function. (High)  Complexity: 2 or more stable chronic illnesses. (Moderate)      I have reviewed records from an external source: notes from outside hospital.    I conducted an independent review of: Labs   I conducted an independent review of: Imaging and/or other diagnostic studies     Treatment Risks: Prescription drug management. (Moderate)    Shared decision making was utilized in the care of this patient. I discussed patient's case with an interdisciplinary team.  I discussed patient's case with a . I discussed patient's case with a nurse. Critical Care Time Spent: 42 minutes. Without intervention, there is a high probability of acute organ impairment or life-threatening deterioration. Diet:  ADULT DIET; Regular; 4 carb choices (60 gm/meal); Low Fat/Low Chol/High Fiber/2 gm Na; No Caffeine, No red dye  DVT PPx: Supratherapeutic INR  Code status: Full Code    Hospital Problems:  Principal Problem:    Chest pain  Active Problems:    Weakness    HLD (hyperlipidemia)    CAD (coronary artery disease)    S/P CABG x 3    PAF (paroxysmal atrial fibrillation) ([facility])    Warfarin anticoagulation    Supratherapeutic INR    Confusion    CHF (congestive heart failure) ([facility])  Resolved Problems:    * No resolved hospital problems.  *      Objective:   Patient Vitals for the past 24 hrs:   Temp Pulse Resp BP SpO2   01/31/23 0845 -- 92 -- -- --   01/31/23 0819 97.8 °F (36.6 °C) (!) 42 16 (!) 116/55 98 %   01/31/23 0807 -- 62 14 -- 97 %   01/31/23 0312 97.2 °F (36.2 °C) 87 19 (!) 109/42 98 %   01/30/23 2314 97.9 °F (36.6 °C) 65 18 (!) 104/51 98 %   01/30/23 2025 -- 78 -- 123/66 --   01/30/23 1919 97.5 °F (36.4 °C) (!) 43 17 (!) 112/53 97 %   01/30/23 1915 -- 69 16 -- 98 %   01/30/23 1700 97.6 °F (36.4 °C) 70 16 (!) 108/49 99 %         Oxygen Therapy  SpO2: 98 %  Pulse Oximeter Device Mode: Intermittent  O2 Device: None (Room air)    Estimated body mass index is 16.62 kg/m² as calculated from the following:    Height as of this encounter: 5' 4\" (1.626 m). Weight as of this encounter: 96 lb 12.8 oz (43.9 kg). Intake/Output Summary (Last 24 hours) at 1/31/2023 1234  Last data filed at 1/31/2023 1202  Gross per 24 hour   Intake 630 ml   Output 650 ml   Net -20 ml           Physical Exam:     Blood pressure (!) 116/55, pulse 92, temperature 97.8 °F (36.6 °C), temperature source Oral, resp. rate 16, height 5' 4\" (1.626 m), weight 96 lb 12.8 oz (43.9 kg), SpO2 98 %. General:    Well nourished. Head:  Normocephalic, atraumatic  Eyes:  Sclerae appear normal.  Pupils equally round. ENT:  Nares appear normal.  Moist oral mucosa  Neck:  No restricted ROM. Trachea midline   CV:   RRR. No m/r/g. No jugular venous distension. Lungs:   CTAB. No wheezing, rhonchi, or rales. Symmetric expansion. Abdomen:   Soft, nontender, nondistended. Extremities: No cyanosis or clubbing. No edema  Skin:     No rashes and normal coloration. Warm and dry. Neuro:  CN II-XII grossly intact. Psych:  Normal mood and affect.       I have personally reviewed labs and tests:  Recent Labs:  Recent Results (from the past 48 hour(s))   Magnesium    Collection Time: 01/30/23  5:36 AM   Result Value Ref Range    Magnesium 2.1 1.8 - 2.4 mg/dL   Protime-INR    Collection Time: 01/30/23  5:36 AM   Result Value Ref Range    Protime 86.0 (H) 12.6 - 14.3 sec    INR >9.0 (HH)    PLEASE READ & DOCUMENT PPD TEST IN 24 HRS    Collection Time: 01/30/23  1:47 PM   Result Value Ref Range    PPD, (POC) Negative Negative    mm Induration 0 0 - 5 mm   CBC    Collection Time: 01/31/23  5:34 AM   Result Value Ref Range    WBC 6.5 4.3 - 11.1 K/uL    RBC 2.02 (L) 4.05 - 5.2 M/uL    Hemoglobin 5.9 (LL) 11.7 - 15.4 g/dL    Hematocrit 18.2 (L) 35.8 - 46.3 %    MCV 90.1 82 - 102 FL    MCH 29.2 26.1 - 32.9 PG    MCHC 32.4 31.4 - 35.0 g/dL    RDW 19.8 (H) 11.9 - 14.6 %    Platelets 888 224 - 445 K/uL    MPV 10.2 9.4 - 12.3 FL    nRBC 0.00 0.0 - 0.2 K/uL   Magnesium    Collection Time: 01/31/23  5:34 AM   Result Value Ref Range    Magnesium 2.2 1.8 - 2.4 mg/dL   Protime-INR    Collection Time: 01/31/23  5:34 AM   Result Value Ref Range    Protime 19.7 (H) 12.6 - 14.3 sec    INR 1.6     Hemoglobin and Hematocrit    Collection Time: 01/31/23  9:29 AM   Result Value Ref Range    Hemoglobin 6.1 (LL) 11.7 - 15.4 g/dL    Hematocrit 19.0 (L) 35.8 - 46.3 %   PREPARE RBC (CROSSMATCH), 2 Units    Collection Time: 01/31/23 10:45 AM   Result Value Ref Range    History Check Historical check performed        I have personally reviewed imaging studies:  Other Studies:  XR CHEST PORTABLE   Final Result      1. No evidence of pneumonia or pulmonary edema.              Current Meds:  Current Facility-Administered Medications   Medication Dose Route Frequency    0.9 % sodium chloride infusion   IntraVENous PRN    sodium chloride flush 0.9 % injection 5-40 mL  5-40 mL IntraVENous 2 times per day    sodium chloride flush 0.9 % injection 5-40 mL  5-40 mL IntraVENous PRN    0.9 % sodium chloride infusion   IntraVENous PRN    ondansetron (ZOFRAN-ODT) disintegrating tablet 4 mg  4 mg Oral Q8H PRN    Or    ondansetron (ZOFRAN) injection 4 mg  4 mg IntraVENous Q6H PRN    acetaminophen (TYLENOL) tablet 650 mg  650 mg Oral Q6H PRN    Or    acetaminophen (TYLENOL) suppository 650 mg  650 mg Rectal Q6H PRN    polyethylene glycol (GLYCOLAX) packet 17 g  17 g Oral Daily PRN    aspirin chewable tablet 81 mg  81 mg Oral Daily    potassium chloride (KLOR-CON M) extended release tablet 40 mEq  40 mEq Oral PRN    Or    potassium bicarb-citric acid (EFFER-K) effervescent tablet 40 mEq  40 mEq Oral PRN    Or    potassium chloride 10 mEq/100 mL IVPB (Peripheral Line)  10 mEq IntraVENous PRN    magnesium sulfate 2000 mg in 50 mL IVPB premix  2,000 mg IntraVENous PRN    aluminum & magnesium hydroxide-simethicone (MAALOX) 200-200-20 MG/5ML suspension 30 mL  30 mL Oral Q6H PRN    nitroGLYCERIN (NITROSTAT) SL tablet 0.4 mg  0.4 mg SubLINGual Q5 Min PRN    morphine (PF) injection 2 mg  2 mg IntraVENous Q2H PRN    Or    morphine injection 4 mg  4 mg IntraVENous Q2H PRN    atorvastatin (LIPITOR) tablet 40 mg  40 mg Oral Daily    ferrous sulfate (IRON 325) tablet 325 mg  325 mg Oral Daily with breakfast    mometasone-formoterol (DULERA) 100-5 MCG/ACT inhaler 2 puff  2 puff Inhalation BID    metoprolol succinate (TOPROL XL) extended release tablet 25 mg  25 mg Oral Daily    sacubitril-valsartan (ENTRESTO) 24-26 MG per tablet 1 tablet  1 tablet Oral BID    venlafaxine (EFFEXOR) tablet 37.5 mg  37.5 mg Oral Daily    traMADol (ULTRAM) tablet 50 mg  50 mg Oral Q6H PRN       Signed:  Mary Yeh MD    Part of this note may have been written by using a voice dictation software. The note has been proof read but may still contain some grammatical/other typographical errors.

## 2023-01-31 NOTE — CONSULTS
[facility] Consult Note       Primary GI Physician: GI group in [facility]    Referring Provider:  Alfonso Piedra MD    Consult Date:  1/31/2023    Admit Date:  1/29/2023    Chief Complaint:  acute drop in hb, no obvious bleeding    Subjective:     History of Present Illness:  Patient is a 65 y.o. female with PMH including but not limited to CAD, CABG, pAFIB, dementia and CHF , who is seen in consultation at the request of Ezio Melendez MD for acute drop in hb, no obvious bleeding. Hgb 8.9 on 1/29/23 with INR 7.8, INR on 1/30/23 >9.0, Hgb at 0500 5.9 with INR now at 1.6, Hgb at 0929 up to 6.1. Patient  has received PO vitamin K. Patient was initially admitted to cardiology service for chest pain evaluation after she was brought to ER for weakness. Cardiology has reviewed her ECG and found no evidence of Cardiac ischemia. Cardiology currently signed off and hospitalist is primary. Spoke with patient and her . Per patient, she has dark stools all the time as she in on oral iron and has been since November. She states that there were a few episodes last week where she thought it looked different. Patient's  confirmed that patient has been having dark blackish colored stools for a while now but not certain when it began. She denies any NSAID use or ETOH. She denies any upper GI symptoms or history of heartburn/reflux. Upon further looking into care every where, patient was seen at [address] [place] [address] on 1/28/23 and Hgb at that time was 9.1 with INR 7.4. Per patient reports colonoscopy >10 years ago in [place]. Reports that it was normal  EGD 2-3 years ago in [place] as she has a \"pouch\" in her throat and requires occasional esophageal dilation        PMH:  Past Medical History:   Diagnosis Date    Vapes nicotine containing substance        PSH:  No past surgical history on file. Allergies:   Allergies   Allergen Reactions    Tetanus Toxoids        Home Medications:  Prior to Admission medications    Medication Sig Start Date End Date Taking?  Authorizing Provider   fluticasone-salmeterol (ADVAIR) 100-50 MCG/ACT AEPB diskus inhaler Inhale 1 puff into the lungs 2 times daily   Yes Historical Provider, MD   atorvastatin (LIPITOR) 40 MG tablet Take 40 mg by mouth daily   Yes Historical Provider, MD   digoxin (LANOXIN) 125 MCG tablet Take 125 mcg by mouth daily   Yes Historical Provider, MD   sacubitril-valsartan (ENTRESTO) 24-26 MG per tablet Take by mouth in the morning and at bedtime   Yes Historical Provider, MD   ferrous sulfate (IRON 325) 325 (65 Fe) MG tablet Take 325 mg by mouth daily (with breakfast)   Yes Historical Provider, MD   furosemide (LASIX) 20 MG tablet Take 20 mg by mouth daily   Yes Historical Provider, MD   spironolactone (ALDACTONE) 25 MG tablet Take 25 mg by mouth 2 times daily   Yes Historical Provider, MD   metoprolol succinate (TOPROL XL) 25 MG extended release tablet Take 25 mg by mouth daily   Yes Historical Provider, MD   venlafaxine (EFFEXOR) 37.5 MG tablet Take 37.5 mg by mouth daily   Yes Historical Provider, MD       Hospital Medications:  Current Facility-Administered Medications   Medication Dose Route Frequency    sodium chloride flush 0.9 % injection 5-40 mL  5-40 mL IntraVENous 2 times per day    sodium chloride flush 0.9 % injection 5-40 mL  5-40 mL IntraVENous PRN    0.9 % sodium chloride infusion   IntraVENous PRN    ondansetron (ZOFRAN-ODT) disintegrating tablet 4 mg  4 mg Oral Q8H PRN    Or    ondansetron (ZOFRAN) injection 4 mg  4 mg IntraVENous Q6H PRN    acetaminophen (TYLENOL) tablet 650 mg  650 mg Oral Q6H PRN    Or    acetaminophen (TYLENOL) suppository 650 mg  650 mg Rectal Q6H PRN    polyethylene glycol (GLYCOLAX) packet 17 g  17 g Oral Daily PRN    aspirin chewable tablet 81 mg  81 mg Oral Daily    potassium chloride (KLOR-CON M) extended release tablet 40 mEq  40 mEq Oral PRN    Or    potassium bicarb-citric acid (EFFER-K) effervescent tablet 40 mEq  40 mEq Oral PRN    Or    potassium chloride 10 mEq/100 mL IVPB (Peripheral Line)  10 mEq IntraVENous PRN    magnesium sulfate 2000 mg in 50 mL IVPB premix  2,000 mg IntraVENous PRN    aluminum & magnesium hydroxide-simethicone (MAALOX) 200-200-20 MG/5ML suspension 30 mL  30 mL Oral Q6H PRN    nitroGLYCERIN (NITROSTAT) SL tablet 0.4 mg  0.4 mg SubLINGual Q5 Min PRN    morphine (PF) injection 2 mg  2 mg IntraVENous Q2H PRN    Or    morphine injection 4 mg  4 mg IntraVENous Q2H PRN    atorvastatin (LIPITOR) tablet 40 mg  40 mg Oral Daily    ferrous sulfate (IRON 325) tablet 325 mg  325 mg Oral Daily with breakfast    mometasone-formoterol (DULERA) 100-5 MCG/ACT inhaler 2 puff  2 puff Inhalation BID    metoprolol succinate (TOPROL XL) extended release tablet 25 mg  25 mg Oral Daily    sacubitril-valsartan (ENTRESTO) 24-26 MG per tablet 1 tablet  1 tablet Oral BID    venlafaxine (EFFEXOR) tablet 37.5 mg  37.5 mg Oral Daily    traMADol (ULTRAM) tablet 50 mg  50 mg Oral Q6H PRN       Social History:  Social History     Tobacco Use    Smoking status: Not on file    Smokeless tobacco: Not on file   Substance Use Topics    Alcohol use: Not on file       Pt denies any history of drug use, blood transfusions, or tattoos. Family History:  No family history on file. Review of Systems:  A detailed 10 system ROS is obtained, with pertinent positives as listed above. All others are negative. Diet:      Objective:     Physical Exam:  Vitals:  BP (!) 116/55   Pulse 92   Temp 97.8 °F (36.6 °C) (Oral)   Resp 16   Ht 5' 4\" (1.626 m)   Wt 96 lb 12.8 oz (43.9 kg)   SpO2 98%   BMI 16.62 kg/m²   Gen:  Pt is alert, cooperative, no acute distress, some confusion  Skin:  Extremities and face reveal no rashes. HEENT: Sclerae anicteric. Extra-occular muscles are intact. No abnormal pigmentation of the lips. Cardiovascular: Regular rate and rhythm.    Respiratory:  Comfortable breathing with no accessory muscle use. Clear breath sounds anteriorly with no wheezes, rales, or rhonchi. GI:  Abdomen nondistended, soft, and nontender. Normal active bowel sounds. Rectal:  Deferred  Musculoskeletal:  No pitting edema of the lower legs. Neurological:    Patient is alert and oriented. Some confusion  Psychiatric:  Mood appears appropriate with judgement intact. .    Laboratory:    Recent Labs     01/29/23  0507 01/29/23  1018 01/30/23  0536 01/31/23  0534 01/31/23  0929   WBC 5.7  --   --  6.5  --    HGB 8.9*  --   --  5.9* 6.1*   HCT 27.7*  --   --  18.2* 19.0*     --   --  210  --    MCV 88.8  --   --  90.1  --    NA  --  135  --   --   --    K  --  4.2  --   --   --    CL  --  100*  --   --   --    CO2  --  28  --   --   --    BUN  --  47*  --   --   --    CREATININE  --  1.20*  --   --   --    CALCIUM  --  8.4  --   --   --    MG 2.3  --  2.1 2.2  --    GLUCOSE  --  115*  --   --   --    INR 7.8*  --  >9.0* 1.6  --        Assessment:     Principal Problem:    Chest pain  Active Problems:    Weakness    HLD (hyperlipidemia)    CAD (coronary artery disease)    S/P CABG x 3    PAF (paroxysmal atrial fibrillation) ([facility])    Warfarin anticoagulation    Supratherapeutic INR    Confusion    CHF (congestive heart failure) ([facility])  Resolved Problems:    * No resolved hospital problems. *  Patient is a 65 y.o. female with PMH including but not limited to CAD, CABG, pAFIB, dementia and CHF , who is seen in consultation at the request of Yamini Ndiaye MD for acute drop in hb, no obvious bleeding. Hgb 8.9 on 1/29/23 with INR 7.8, INR on 1/30/23 >9.0, Hgb at 0500 5.9 with INR now at 1.6, Hgb at 0929 up to 6.1. Patient has received PO vitamin K. There has been no evidence of bleeding. Plan:     Monitor hgb and transfuse per primary team parameters  Unclear if there is active bleeding as there has been dark stool, but patient has what sounds like chronic black/dark stool.   Will discuss with MD if endoscopy beneficial     May benefit from PPI      Patient is seen and examined in collaboration with Dr. Elysia Daniel.  Assessment and plan as per Dr. Elysia Daniel.

## 2023-01-31 NOTE — PROGRESS NOTES
Bedside and Verbal shift change report given to self (oncoming nurse) by Missy Coombs RN (offgoing nurse). Report included the following information Nurse Handoff Report, Index, Intake/Output, MAR, Recent Results, and Cardiac Rhythm NM .

## 2023-01-31 NOTE — PROGRESS NOTES
ACUTE PHYSICAL THERAPY GOALS:   (Developed with and agreed upon by patient and/or caregiver. )  LTG:  (1.)Ms. Nicole Kim will move from supine to sit and sit to supine , scoot up and down, and roll side to side in bed with INDEPENDENCE within 7 treatment day(s). (2.)Ms. Nicole Kim will transfer from bed to chair and chair to bed with INDEPENDENCE using the least restrictive device within 7 treatment day(s). (3.)Ms. Nicole Kim will ambulate with MODIFIED INDEPENDENCE for 500 feet with the least restrictive device within 7 treatment day(s). (4.)Ms. Nicole Kim will participate in therapeutic activity/exercises x 25 minutes for increased activity tolerance within 7 treatment days. (5.)Ms. Nicole Kim will perform standing static and dynamic balance activities x 15 minutes with SUPERVISION to improve safety within 7 day(s). PHYSICAL THERAPY: Daily Note AM   (Link to Caseload Tracking: PT Visit Days : 2  Time In/Out PT Charge Capture  Rehab Caseload Tracker  Orders    Amanda Bedolla is a 65 y.o. female   PRIMARY DIAGNOSIS: Chest pain  Chest pain [R07.9]       Inpatient: Payor: Marce Baer / Plan: MEDICARE PART A AND B / Product Type: *No Product type* /     ASSESSMENT:     REHAB RECOMMENDATIONS:   Recommendation to date pending progress:  Setting:  Home Health Therapy    Equipment:    To Be Determined     ASSESSMENT:  Ms. Nicole Kim was sitting up in recliner chair upon contact and agreeable to PT. BP improved. Confused; pulled out both IV's, RN aware. Patient transferred to standing with SBA and ambulated in hallway 225' x 2 holding onto objects in hallway/hallway railing; declines use of RW despite unsteady gait and \"furniture walking\". Patient took a seated rest break senior care through ambulation due to fatigue. Patient returned to room where she took a seated rest break followed by LE exercises. Overall steady progress. Will continue PT efforts.      SUBJECTIVE:   Ms. Nicole Kim states, \"I don't know where I live\" Social/Functional Lives With: Spouse  Type of Home: House  Home Equipment: [employer], rolling (RW not in use)  Receives Help From: Family  ADL Assistance: Independent  Homemaking Assistance: Independent  Ambulation Assistance: Independent  Transfer Assistance: Independent  Active : Yes  Mode of Transportation: Car  OBJECTIVE:     PAIN: Snead Ra / O2: PRECAUTION / Rodolph Frye / Jackquline Salon:   Pre Treatment: 0         Post Treatment: 0 Vitals        Oxygen    Telemetry     RESTRICTIONS/PRECAUTIONS:        MOBILITY: I Mod I S SBA CGA Min Mod Max Total  NT x2 Comments:   Bed Mobility    Rolling [] [] [] [] [] [] [] [] [] [] []    Supine to Sit [] [] [] [] [] [] [] [] [] [] []    Scooting [] [] [] [] [] [] [] [] [] [] []    Sit to Supine [] [] [] [] [] [] [] [] [] [] []    Transfers    Sit to Stand [] [] [] [x] [] [] [] [] [] [] []    Bed to Chair [] [] [] [] [] [] [] [] [] [] []    Stand to Sit [] [] [] [x] [] [] [] [] [] [] []     [] [] [] [] [] [] [] [] [] [] []    I=Independent, Mod I=Modified Independent, S=Supervision, SBA=Standby Assistance, CGA=Contact Guard Assistance,   Min=Minimal Assistance, Mod=Moderate Assistance, Max=Maximal Assistance, Total=Total Assistance, NT=Not Tested    BALANCE: Good Fair+ Fair Fair- Poor NT Comments   Sitting Static [x] [] [] [] [] []    Sitting Dynamic [] [x] [] [] [] []              Standing Static [] [] [x] [] [] []    Standing Dynamic [] [] [x] [x] [] []      GAIT: I Mod I S SBA CGA Min Mod Max Total  NT x2 Comments:   Level of Assistance [] [] [] [x] [x] [] [] [] [] [] [] Declines use of RW   Distance   225 feet x 2    [employer] Furniture walking/hallway railing    Gait Quality Decreased shahzad , Decreased step clearance, Decreased step length, and Path deviations     Weightbearing Status      Stairs      I=Independent, Mod I=Modified Independent, S=Supervision, SBA=Standby Assistance, CGA=Contact Guard Assistance,   Min=Minimal Assistance, Mod=Moderate Assistance, Max=Maximal Assistance, Total=Total Assistance, NT=Not Tested    PLAN:   FREQUENCY AND DURATION: 3 times/week for duration of hospital stay or until stated goals are met, whichever comes first.    TREATMENT:   TREATMENT:   Therapeutic Activity (23 Minutes): Therapeutic activity included Scooting, Transfer Training, Ambulation on level ground, Sitting balance , Standing balance, and LE exercises to improve functional Activity tolerance, Balance, Mobility, and Strength. TREATMENT GRID:  N/A    AFTER TREATMENT PRECAUTIONS: Alarm Activated, Bed/Chair Locked, Call light within reach, Chair, Needs within reach, and RN notified    INTERDISCIPLINARY COLLABORATION:  RN/ PCT and PT/ PTA    EDUCATION:      TIME IN/OUT:  Time In: [address] [place] [address] Dr  Time Out: [facility]  Minutes: [address] [facility].  WINSTON Iqbal

## 2023-01-31 NOTE — CARE COORDINATION
LOS 2D  Patient's status discussed in IDR. Patient's Hgb decreased to 5.9 this AM with no obvious source of bleeding. Transfusion order placed. GI consulted. PT/OT recommending home health at discharge. CM will continue to follow and update DCP.

## 2023-01-31 NOTE — PLAN OF CARE
Problem: Respiratory - Adult  Goal: Achieves optimal ventilation and oxygenation  Outcome: Progressing  Flowsheets (Taken 1/31/2023 1021)  Achieves optimal ventilation and oxygenation:   Assess for changes in respiratory status   Position to facilitate oxygenation and minimize respiratory effort   Respiratory therapy support as indicated   Assess for changes in mentation and behavior   Encourage broncho-pulmonary hygiene including cough, deep breathe, incentive spirometry   Assess and instruct to report shortness of breath or any respiratory difficulty

## 2023-01-31 NOTE — PROGRESS NOTES
Bedside and Verbal shift change report given to Rosemary RN (oncoming nurse) by self (offgoing nurse). Report included the following information Nurse Handoff Report, Index, Intake/Output, MAR, and Recent Results. Pt is nonmonitored. Blood infusing at 150 mL/hr. Will need an order to check Hgb/Hct after this transfusion is complete.

## 2023-02-01 LAB
ABO + RH BLD: NORMAL
ANION GAP SERPL CALC-SCNC: 4 MMOL/L (ref 2–11)
BASOPHILS # BLD: 0 K/UL (ref 0–0.2)
BASOPHILS NFR BLD: 0 % (ref 0–2)
BLD PROD TYP BPU: NORMAL
BLD PROD TYP BPU: NORMAL
BLOOD BANK DISPENSE STATUS: NORMAL
BLOOD BANK DISPENSE STATUS: NORMAL
BLOOD GROUP ANTIBODIES SERPL: NORMAL
BPU ID: NORMAL
BPU ID: NORMAL
BUN SERPL-MCNC: 16 MG/DL (ref 8–23)
CALCIUM SERPL-MCNC: 8.9 MG/DL (ref 8.3–10.4)
CHLORIDE SERPL-SCNC: 106 MMOL/L (ref 101–110)
CO2 SERPL-SCNC: 25 MMOL/L (ref 21–32)
CREAT SERPL-MCNC: 0.8 MG/DL (ref 0.6–1)
CROSSMATCH RESULT: NORMAL
CROSSMATCH RESULT: NORMAL
DIFFERENTIAL METHOD BLD: ABNORMAL
EOSINOPHIL # BLD: 0.1 K/UL (ref 0–0.8)
EOSINOPHIL NFR BLD: 1 % (ref 0.5–7.8)
ERYTHROCYTE [DISTWIDTH] IN BLOOD BY AUTOMATED COUNT: 23 % (ref 11.9–14.6)
GLUCOSE SERPL-MCNC: 85 MG/DL (ref 65–100)
HAPTOGLOB SERPL-MCNC: 49 MG/DL (ref 30–200)
HCT VFR BLD AUTO: 29.1 % (ref 35.8–46.3)
HGB BLD-MCNC: 9.5 G/DL (ref 11.7–15.4)
IMM GRANULOCYTES # BLD AUTO: 0.1 K/UL (ref 0–0.5)
IMM GRANULOCYTES NFR BLD AUTO: 1 % (ref 0–5)
LYMPHOCYTES # BLD: 1.3 K/UL (ref 0.5–4.6)
LYMPHOCYTES NFR BLD: 16 % (ref 13–44)
MCH RBC QN AUTO: 27.9 PG (ref 26.1–32.9)
MCHC RBC AUTO-ENTMCNC: 32.6 G/DL (ref 31.4–35)
MCV RBC AUTO: 85.6 FL (ref 82–102)
MM INDURATION, POC: 0 MM (ref 0–5)
MONOCYTES # BLD: 1.2 K/UL (ref 0.1–1.3)
MONOCYTES NFR BLD: 15 % (ref 4–12)
NEUTS SEG # BLD: 5.2 K/UL (ref 1.7–8.2)
NEUTS SEG NFR BLD: 67 % (ref 43–78)
NRBC # BLD: 0 K/UL (ref 0–0.2)
PLATELET # BLD AUTO: 248 K/UL (ref 150–450)
PMV BLD AUTO: 10.1 FL (ref 9.4–12.3)
POTASSIUM SERPL-SCNC: 4 MMOL/L (ref 3.5–5.1)
PPD, POC: NEGATIVE
RBC # BLD AUTO: 3.4 M/UL (ref 4.05–5.2)
SODIUM SERPL-SCNC: 135 MMOL/L (ref 133–143)
SPECIMEN EXP DATE BLD: NORMAL
UNIT DIVISION: 0
UNIT DIVISION: 0
WBC # BLD AUTO: 7.8 K/UL (ref 4.3–11.1)

## 2023-02-01 PROCEDURE — 94760 N-INVAS EAR/PLS OXIMETRY 1: CPT

## 2023-02-01 PROCEDURE — C9113 INJ PANTOPRAZOLE SODIUM, VIA: HCPCS | Performed by: INTERNAL MEDICINE

## 2023-02-01 PROCEDURE — 6370000000 HC RX 637 (ALT 250 FOR IP): Performed by: FAMILY MEDICINE

## 2023-02-01 PROCEDURE — 36415 COLL VENOUS BLD VENIPUNCTURE: CPT

## 2023-02-01 PROCEDURE — 2580000003 HC RX 258: Performed by: FAMILY MEDICINE

## 2023-02-01 PROCEDURE — 6360000002 HC RX W HCPCS: Performed by: INTERNAL MEDICINE

## 2023-02-01 PROCEDURE — 97530 THERAPEUTIC ACTIVITIES: CPT

## 2023-02-01 PROCEDURE — 85025 COMPLETE CBC W/AUTO DIFF WBC: CPT

## 2023-02-01 PROCEDURE — 2580000003 HC RX 258: Performed by: NURSE PRACTITIONER

## 2023-02-01 PROCEDURE — 97112 NEUROMUSCULAR REEDUCATION: CPT

## 2023-02-01 PROCEDURE — 94640 AIRWAY INHALATION TREATMENT: CPT

## 2023-02-01 PROCEDURE — 6360000002 HC RX W HCPCS: Performed by: FAMILY MEDICINE

## 2023-02-01 PROCEDURE — A4216 STERILE WATER/SALINE, 10 ML: HCPCS | Performed by: INTERNAL MEDICINE

## 2023-02-01 PROCEDURE — 1100000003 HC PRIVATE W/ TELEMETRY

## 2023-02-01 PROCEDURE — 2580000003 HC RX 258: Performed by: INTERNAL MEDICINE

## 2023-02-01 PROCEDURE — 6370000000 HC RX 637 (ALT 250 FOR IP): Performed by: NURSE PRACTITIONER

## 2023-02-01 PROCEDURE — 80048 BASIC METABOLIC PNL TOTAL CA: CPT

## 2023-02-01 RX ORDER — LORAZEPAM 0.5 MG/1
0.5 TABLET ORAL ONCE
Status: COMPLETED | OUTPATIENT
Start: 2023-02-01 | End: 2023-02-01

## 2023-02-01 RX ADMIN — SODIUM CHLORIDE, PRESERVATIVE FREE 10 ML: 5 INJECTION INTRAVENOUS at 10:24

## 2023-02-01 RX ADMIN — SODIUM CHLORIDE 40 MG: 9 INJECTION, SOLUTION INTRAMUSCULAR; INTRAVENOUS; SUBCUTANEOUS at 10:14

## 2023-02-01 RX ADMIN — SACUBITRIL AND VALSARTAN 1 TABLET: 24; 26 TABLET, FILM COATED ORAL at 10:14

## 2023-02-01 RX ADMIN — VENLAFAXINE 37.5 MG: 25 TABLET ORAL at 10:13

## 2023-02-01 RX ADMIN — MOMETASONE FUROATE AND FORMOTEROL FUMARATE DIHYDRATE 2 PUFF: 100; 5 AEROSOL RESPIRATORY (INHALATION) at 20:25

## 2023-02-01 RX ADMIN — METOPROLOL SUCCINATE 25 MG: 25 TABLET, EXTENDED RELEASE ORAL at 10:14

## 2023-02-01 RX ADMIN — ZIPRASIDONE MESYLATE 5 MG: 20 INJECTION, POWDER, LYOPHILIZED, FOR SOLUTION INTRAMUSCULAR at 03:44

## 2023-02-01 RX ADMIN — LORAZEPAM 0.5 MG: 0.5 TABLET ORAL at 00:38

## 2023-02-01 RX ADMIN — ATORVASTATIN CALCIUM 40 MG: 40 TABLET, FILM COATED ORAL at 10:13

## 2023-02-01 RX ADMIN — SACUBITRIL AND VALSARTAN 1 TABLET: 24; 26 TABLET, FILM COATED ORAL at 19:46

## 2023-02-01 RX ADMIN — ASPIRIN 81 MG: 81 TABLET, CHEWABLE ORAL at 10:13

## 2023-02-01 RX ADMIN — SODIUM CHLORIDE, PRESERVATIVE FREE 10 ML: 5 INJECTION INTRAVENOUS at 19:46

## 2023-02-01 RX ADMIN — FERROUS SULFATE TAB 325 MG (65 MG ELEMENTAL FE) 325 MG: 325 (65 FE) TAB at 09:00

## 2023-02-01 ASSESSMENT — PAIN SCALES - GENERAL
PAINLEVEL_OUTOF10: 0

## 2023-02-01 NOTE — PROGRESS NOTES
ACUTE OCCUPATIONAL THERAPY GOALS:   (Developed with and agreed upon by patient and/or caregiver.)  1. Patient will complete lower body bathing and dressing with MOD I and adaptive equipment as needed. 2. Patient will complete toileting with MOD I.   3. Patient will tolerate 30 minutes of OT treatment with 1-2 rest breaks to increase activity tolerance for ADLs. 4. Patient will complete functional transfers with MOD I and adaptive equipment as needed. 5. Patient will complete functional mobility for household distances with MOD I and adaptive equipment as needed. 6. Patient will complete self-grooming while standing edge of sink with MOD I and adaptive equipment as needed. Timeframe: 7 visits         OCCUPATIONAL THERAPY Daily Note and AM     OT Visit Days: 2   Time  OT Charge Capture  Rehab Caseload Tracker  OT Orders    Jaylin Calvo is a 65 y.o. female   PRIMARY DIAGNOSIS: Chest pain  Chest pain [R07.9]       Inpatient: Payor: Negro Mancia / Plan: MEDICARE PART A AND B / Product Type: *No Product type* /     ASSESSMENT:     REHAB RECOMMENDATIONS: CURRENT LEVEL OF FUNCTION:  (Most Recently Demonstrated)   Recommendation to date pending progress:  Setting:  Home Health Therapy    Equipment:    None Bathing:  Not Tested  Dressing:  Not Tested  Feeding/Grooming:  Not Tested  Toileting:  Not Tested  Functional Mobility:  Supervision/Setup     ASSESSMENT:  Ms. Wayne Asher continues to present with deficits in overall strength, activity tolerance, ADL performance, and functional mobility. Presents sitting upright in chair. Remains hypotensive. Moving well with SBA for fx transfers and mobility today. BP assessed in standing d/t noted dizziness. BP dropping to 79/54; short seated rest break provided before assessing BP once again with use of different machine. BP remaining low at 78/51 (RN aware). Limited by BP today. Slow progress towards therapy goals and plan of care. Will continue OT efforts as indicated. SUBJECTIVE:     Ms. David Le states, \"I don't feel near as dizzy today. \"     Social/Functional Lives With: Spouse  Type of Home: House  Home Equipment: [employer], rolling (RW not in use)  Receives Help From: Family  ADL Assistance: Independent  Homemaking Assistance: Independent  Ambulation Assistance: Independent  Transfer Assistance: Independent  Active : Yes  Mode of Transportation: Car    OBJECTIVE:     Sophie Lopez / María Jurist / AIRWAY: None    RESTRICTIONS/PRECAUTIONS:           PAIN: VITALS / O2:   Pre Treatment:   Numeric: 0/10             Post Treatment: 0 /10 Vitals          Oxygen                MOBILITY: I Mod I S SBA CGA Min Mod Max Total  NT x2 Comments:   Bed Mobility    Rolling [] [] [] [] [] [] [] [] [] [] [] Already up to chair   Supine to Sit [] [] [] [] [] [] [] [] [] [] []    Scooting [] [] [] [] [] [] [] [] [] [] []    Sit to Supine [] [] [] [] [] [] [] [] [] [] []    Transfers    Sit to Stand [] [] [] [x] [] [] [] [] [] [] []    Bed to Chair [] [] [] [x] [] [] [] [] [] [] []    Stand to Sit [] [] [] [x] [] [] [] [] [] [] []    I=Independent, Mod I=Modified Independent, S=Supervision/Setup, SBA=Standby Assistance, CGA=Contact Guard Assistance, Min=Minimal Assistance, Mod=Moderate Assistance, Max=Maximal Assistance, Total=Total Assistance, NT=Not Tested    ACTIVITIES OF DAILY LIVING: I Mod I S SBA CGA Min Mod Max Total NT Comments   BASIC ADLs:              Bathing/ Showering [] [] [] [] [] [] [] [] [] [x]    Toileting [] [] [] [] [] [] [] [] [] [x]    Upper Body Dressing [] [] [] [] [] [] [] [] [] [x]    Lower Body Dressing [] [] [] [] [] [] [] [] [] [x]    Feeding [] [] [] [] [] [] [] [] [] [x]    Grooming [] [] [] [] [] [] [] [] [] [x]    Personal Device Care [] [] [] [] [] [] [] [] [] [x]    Functional Mobility [] [] [] [x] [] [] [] [] [] []    I=Independent, Mod I=Modified Independent, S=Supervision/Setup, SBA=Standby Assistance, CGA=Contact Guard Assistance, Min=Minimal Assistance, Mod=Moderate Assistance, Max=Maximal Assistance, Total=Total Assistance, NT=Not Tested    BALANCE: Good Fair+ Fair Fair- Poor NT Comments   Sitting Static [x] [] [] [] [] []    Sitting Dynamic [x] [] [] [] [] []              Standing Static [x] [] [] [] [] []    Standing Dynamic [] [x] [] [] [] []        PLAN:     FREQUENCY/DURATION   OT Plan of Care: 3 times/week for duration of hospital stay or until stated goals are met, whichever comes first.      TREATMENT:     TREATMENT:   Neuromuscular Re-education (10 Minutes): Neuromuscular Re-education included Balance Training, Coordination training, Functional mobility with facilitation, Postural training, Sitting balance training, and Standing balance training to improve Balance, Coordination, Functional Mobility, and Postural Control.     TREATMENT GRID:  N/A    AFTER TREATMENT PRECAUTIONS: Call light within reach, Chair, Needs within reach, and RN notified    INTERDISCIPLINARY COLLABORATION:  RN/ PCT, PT/ PTA, and OT/ SANDOVAL    EDUCATION:        TOTAL TREATMENT DURATION AND TIME:  Time In: 0053  Time Out: [address]  Minutes: [address], OTR/L, CLT

## 2023-02-01 NOTE — PROGRESS NOTES
ACUTE PHYSICAL THERAPY GOALS:   (Developed with and agreed upon by patient and/or caregiver. )  LTG:  (1.)Ms. Gissell Diego will move from supine to sit and sit to supine , scoot up and down, and roll side to side in bed with INDEPENDENCE within 7 treatment day(s). (2.)Ms. Gissell Diego will transfer from bed to chair and chair to bed with INDEPENDENCE using the least restrictive device within 7 treatment day(s). (3.)Ms. Gissell Diego will ambulate with MODIFIED INDEPENDENCE for 500 feet with the least restrictive device within 7 treatment day(s). (4.)Ms. Gissell Diego will participate in therapeutic activity/exercises x 25 minutes for increased activity tolerance within 7 treatment days. (5.)Ms. Gissell Diego will perform standing static and dynamic balance activities x 15 minutes with SUPERVISION to improve safety within 7 day(s). PHYSICAL THERAPY: Daily Note AM   (Link to Caseload Tracking: PT Visit Days : 3  Time In/Out PT Charge Capture  Rehab Caseload Tracker  Orders    Aminta Taylor is a 65 y.o. female   PRIMARY DIAGNOSIS: Chest pain  Chest pain [R07.9]       Inpatient: Payor: Pradeep Wilson / Plan: MEDICARE PART A AND B / Product Type: *No Product type* /     ASSESSMENT:     REHAB RECOMMENDATIONS:   Recommendation to date pending progress:  Setting:  Home Health Therapy    Equipment:    To Be Determined     ASSESSMENT:  Ms. Gissell Diego presents sitting up in recliner with spouse at bedside, no complaints and agreeable to therapy. Performs transfers with supervision, ambulation around room with min HHA and stood at bedside for BP check. Was hypotensive however asymptomatic and wanting to walk. Provided with RW, ambulation x 180 ft in hallway with SBA and min cues for walker management/gait safety. Returned to room, standing BP slightly improved after session. Practiced functional transfers and standing activities, then back to recliner after session. Overall good participation and pt progressing well, limited by hypotension. Anticipate dc home with [facility] PT when medically stable.      SUBJECTIVE:   Ms. Larry Hamlin states, \"I'm from [facility]"     Social/Functional Lives With: Spouse  Type of Home: House  Home Equipment: Neelima Love, rolling (RW not in use)  Receives Help From: Family  ADL Assistance: Independent  Homemaking Assistance: Independent  Ambulation Assistance: Independent  Transfer Assistance: Independent  Active : Yes  Mode of Transportation: Car  OBJECTIVE:     PAIN: VITALS / O2: PRECAUTION / Cindi Roman / Nohemy Cueva:   Pre Treatment: 0         Post Treatment: 0 Vitals        Oxygen    Telemetry     RESTRICTIONS/PRECAUTIONS:        MOBILITY: I Mod I S SBA CGA Min Mod Max Total  NT x2 Comments:   Bed Mobility    Rolling [] [] [] [] [] [] [] [] [] [] []    Supine to Sit [] [] [] [] [] [] [] [] [] [] []    Scooting [] [] [] [] [] [] [] [] [] [] []    Sit to Supine [] [] [] [] [] [] [] [] [] [] []    Transfers    Sit to Stand [] [] [x] [] [] [] [] [] [] [] []    Bed to Chair [] [] [] [] [] [] [] [] [] [] []    Stand to Sit [] [] [x] [] [] [] [] [] [] [] []     [] [] [] [] [] [] [] [] [] [] []    I=Independent, Mod I=Modified Independent, S=Supervision, SBA=Standby Assistance, CGA=Contact Guard Assistance,   Min=Minimal Assistance, Mod=Moderate Assistance, Max=Maximal Assistance, Total=Total Assistance, NT=Not Tested    BALANCE: Good Fair+ Fair Fair- Poor NT Comments   Sitting Static [x] [] [] [] [] []    Sitting Dynamic [] [x] [] [] [] []              Standing Static [] [x] [] [] [] []    Standing Dynamic [] [x] [] [] [] []      GAIT: I Mod I S SBA CGA Min Mod Max Total  NT x2 Comments:   Level of Assistance [] [] [] [x] [] [] [] [] [] [] [] Declines use of RW   Distance   180 ft    DME Rolling Walker    Gait Quality Narrow base of support    Weightbearing Status      Stairs      I=Independent, Mod I=Modified Independent, S=Supervision, SBA=Standby Assistance, CGA=Contact Guard Assistance,   Min=Minimal Assistance, Mod=Moderate Assistance, Max=Maximal Assistance, Total=Total Assistance, NT=Not Tested    PLAN:   FREQUENCY AND DURATION: 3 times/week for duration of hospital stay or until stated goals are met, whichever comes first.    TREATMENT:   TREATMENT:   Therapeutic Activity (16 Minutes): Therapeutic activity included Scooting, Transfer Training, Ambulation on level ground, Sitting balance , and Standing balance to improve functional Activity tolerance, Balance, Mobility, and Strength.     TREATMENT GRID:  N/A    AFTER TREATMENT PRECAUTIONS: Bed/Chair Locked, Call light within reach, Chair, Needs within reach, RN notified, and Visitors at bedside    INTERDISCIPLINARY COLLABORATION:  RN/ PCT and PT/ PTA    EDUCATION: Education Given To: Patient  Education Provided: Plan of Care  Education Method: Verbal  Barriers to Learning: Cognition  Education Outcome: Continued education needed;Verbalized understanding    TIME IN/OUT:  Time In: 1340  Time Out: [address] [facility]  Minutes: [address] [facility],

## 2023-02-01 NOTE — PROGRESS NOTES
Patient seen and examined. Agree with ARIELA's note as below. Hgb stable  No further bleeding  Recommend she follow up with her GI in [place] soon  Will sign off    Jennie Naqvi MD  [facility] [place]          [facility] Progress Note         Admit Date:  1/29/2023    Today's Date:  2/1/2023    CC:  acute drop in hgb    Subjective:     Patient resting in bed. No active bleeding at this time. Patient's  reports that patient has not had a bowel movement in 2 days now, but this seems to be her normal.  Patient ready to go home.     Medications:   Current Facility-Administered Medications   Medication Dose Route Frequency    pantoprazole (PROTONIX) 40 mg in sodium chloride (PF) 0.9 % 10 mL injection  40 mg IntraVENous Daily    0.9 % sodium chloride infusion   IntraVENous PRN    sodium chloride flush 0.9 % injection 5-40 mL  5-40 mL IntraVENous 2 times per day    sodium chloride flush 0.9 % injection 5-40 mL  5-40 mL IntraVENous PRN    0.9 % sodium chloride infusion   IntraVENous PRN    ondansetron (ZOFRAN-ODT) disintegrating tablet 4 mg  4 mg Oral Q8H PRN    Or    ondansetron (ZOFRAN) injection 4 mg  4 mg IntraVENous Q6H PRN    acetaminophen (TYLENOL) tablet 650 mg  650 mg Oral Q6H PRN    Or    acetaminophen (TYLENOL) suppository 650 mg  650 mg Rectal Q6H PRN    polyethylene glycol (GLYCOLAX) packet 17 g  17 g Oral Daily PRN    aspirin chewable tablet 81 mg  81 mg Oral Daily    potassium chloride (KLOR-CON M) extended release tablet 40 mEq  40 mEq Oral PRN    Or    potassium bicarb-citric acid (EFFER-K) effervescent tablet 40 mEq  40 mEq Oral PRN    Or    potassium chloride 10 mEq/100 mL IVPB (Peripheral Line)  10 mEq IntraVENous PRN    magnesium sulfate 2000 mg in 50 mL IVPB premix  2,000 mg IntraVENous PRN    aluminum & magnesium hydroxide-simethicone (MAALOX) 200-200-20 MG/5ML suspension 30 mL  30 mL Oral Q6H PRN    nitroGLYCERIN (NITROSTAT) SL tablet 0.4 mg  0.4 mg SubLINGual Q5 Min PRN    morphine (PF) injection 2 mg  2 mg IntraVENous Q2H PRN    Or    morphine injection 4 mg  4 mg IntraVENous Q2H PRN    atorvastatin (LIPITOR) tablet 40 mg  40 mg Oral Daily    ferrous sulfate (IRON 325) tablet 325 mg  325 mg Oral Daily with breakfast    mometasone-formoterol (DULERA) 100-5 MCG/ACT inhaler 2 puff  2 puff Inhalation BID    metoprolol succinate (TOPROL XL) extended release tablet 25 mg  25 mg Oral Daily    sacubitril-valsartan (ENTRESTO) 24-26 MG per tablet 1 tablet  1 tablet Oral BID    venlafaxine (EFFEXOR) tablet 37.5 mg  37.5 mg Oral Daily    traMADol (ULTRAM) tablet 50 mg  50 mg Oral Q6H PRN       Review of Systems:  ROS was obtained, with pertinent positives as listed above. No chest pain or SOB. Diet:  regular    Objective:   Vitals:  BP (!) 107/51   Pulse 82   Temp 97.9 °F (36.6 °C) (Oral)   Resp 18   Ht 5' 4\" (1.626 m)   Wt 98 lb 6.4 oz (44.6 kg)   SpO2 97%   BMI 16.89 kg/m²   Intake/Output:  No intake/output data recorded. 01/30 1901 - 02/01 0700  In: 1194.2 [P.O.:510]  Out: 1500 [Urine:1500]  Exam:  General appearance: alert, restless, no distress  Lungs: clear to auscultation bilaterally anteriorly  Heart: regular rate and rhythm  Abdomen: soft, non-tender.  Bowel sounds normal.  Extremities: extremities normal, atraumatic, no cyanosis or edema  Neuro:  alert and oriented to person, some confusion this morning    Data Review (Labs):    Recent Labs     01/29/23  1018 01/30/23  0536 01/31/23  0534 01/31/23  0929 01/31/23  1040 01/31/23  2118 02/01/23  0709   WBC  --   --  6.5  --   --   --  7.8   HGB  --   --  5.9* 6.1*  --  8.9* 9.5*   HCT  --   --  18.2* 19.0*  --  27.0* 29.1*   PLT  --   --  210  --   --   --  248   MCV  --   --  90.1  --   --   --  85.6     --   --   --  132*  --  135   K 4.2  --   --   --  4.8  --  4.0   *  --   --   --  99*  --  106   CO2 28  --   --   --  27  --  25   BUN 47*  --   --   --  26*  --  16   CREATININE 1.20*  --   --   --  0.90 --  0.80   CALCIUM 8.4  --   --   --  8.9  --  8.9   MG  --  2.1 2.2  --   --   --   --    GLUCOSE 115*  --   --   --  90  --  85   INR  --  >9.0* 1.6  --   --   --   --        Assessment:     Principal Problem:    Chest pain  Active Problems:    Weakness    HLD (hyperlipidemia)    CAD (coronary artery disease)    S/P CABG x 3    PAF (paroxysmal atrial fibrillation) ([facility])    Warfarin anticoagulation    Supratherapeutic INR    Confusion    CHF (congestive heart failure) ([facility])  Resolved Problems:    * No resolved hospital problems. *  Patient is a 65 y.o. female with PMH including but not limited to CAD, CABG, pAFIB, dementia and CHF , who is seen in consultation at the request of Sukhdev Martinez MD for acute drop in hb, no obvious bleeding. Hgb 8.9 on 1/29/23 with INR 7.8, INR on 1/30/23 >9.0, Hgb at 0500 5.9 with INR now at 1.6, Hgb at 0929 up to 6.1. Patient has received PO vitamin K. There has been no evidence of bleeding. Hgb this morning 9.5 with no signs of bleeding. Plan:     Recommended oral PPI and close follow up with GI MD in [place]. Suggested daily miralax to help prevent constipation as oral iron can cause this. Ok to discharge from GI standpoint.    Anticoagulation per cardiac suggestion      Patient is seen and examined in collaboration with Dr. Mia Sandoval.  Assessment and plan as per Dr. Mia Sandoval.

## 2023-02-01 NOTE — PROGRESS NOTES
Hospitalist Progress Note   Admit Date:  2023  3:28 AM   Name:  Simeon Heart   Age:  65 y.o. Sex:  female  :  1945   MRN:  [id number]   Room:  [facility]/01    Presenting Complaint: No chief complaint on file. Reason(s) for Admission: Chest pain [R07.9]     Hospital Course:       Simeon Heart is a 65 y.o. female with medical history of dementia, CAD s/p CABG, pAFIB on coumadin, CHF admitted from [place], [place] to cardiology for chest pain workup. ACS ruled out by cardiology and care transferred to our service due to acute blood loss anemia. HGB declined to 5.9. s/p 2 PRBC. GI consulted. Coumadin on hold. INR > 9.0 reversed with vitamin k . Discharge plans pending to home with . Subjective & 24hr Events (23):  Francisco Romero present, [phone], [phone], he has been at bedside since admit, will return to [place] until discharge, she was agitated overnight and got ativan/ geodon, now sleeping, gets confused , says she got 2 units blood at [facility]      Assessment & Plan:     Principal Problem:    Chest pain  Plan:     CAD (coronary artery disease)  Plan:     S/P CABG x 3  Plan:   Cardiology signed off   On asa, lipitor            PAF (paroxysmal atrial fibrillation) ([facility] [address]  Plan:     Warfarin anticoagulation  Plan:     Supratherapeutic INR  Plan:   Off coumadin           CHF (congestive heart failure) ([facility] [address]  Plan:   Metoprolol, entresto        Acute blood loss anemia:  No plans for scopes per GI  Trend HGB   On iron    Acute encephalopathy  Dementia:  effexor      Anticipated discharge needs:      Pending to home    Diet:  ADULT DIET; Regular; 4 carb choices (60 gm/meal); Low Fat/Low Chol/High Fiber/2 gm Na;  No Caffeine, No red dye  DVT PPx:  SCD  Code status: Full Code    Hospital Problems:  Principal Problem:    Chest pain  Active Problems:    Weakness    HLD (hyperlipidemia)    CAD (coronary artery disease) S/P CABG x 3    PAF (paroxysmal atrial fibrillation) ([facility])    Warfarin anticoagulation    Supratherapeutic INR    Confusion    CHF (congestive heart failure) ([facility] [address]  Resolved Problems:    * No resolved hospital problems. *      Objective:   Patient Vitals for the past 24 hrs:   Temp Pulse Resp BP SpO2   02/01/23 1213 98 °F (36.7 °C) 74 18 103/61 98 %   02/01/23 1014 -- -- -- (!) 107/51 --   02/01/23 0800 97.9 °F (36.6 °C) 82 18 (!) 107/51 97 %   02/01/23 0445 98.2 °F (36.8 °C) 68 18 (!) 103/55 96 %   02/01/23 0023 97.6 °F (36.4 °C) 61 18 (!) 120/53 96 %   01/31/23 2016 98.4 °F (36.9 °C) 66 18 107/81 --   01/31/23 1747 97.4 °F (36.3 °C) 66 18 (!) 112/55 100 %   01/31/23 1736 -- -- -- -- 100 %   01/31/23 1727 97.7 °F (36.5 °C) 70 16 (!) 125/53 98 %   01/31/23 1553 97.6 °F (36.4 °C) 57 16 (!) 125/57 --       Oxygen Therapy  SpO2: 98 %  Pulse Oximeter Device Mode: Intermittent  O2 Device: None (Room air)    Estimated body mass index is 16.89 kg/m² as calculated from the following:    Height as of this encounter: 5' 4\" (1.626 m). Weight as of this encounter: 98 lb 6.4 oz (44.6 kg). Intake/Output Summary (Last 24 hours) at 2/1/2023 1525  Last data filed at 2/1/2023 0023  Gross per 24 hour   Intake 804.17 ml   Output 850 ml   Net -45.83 ml         Physical Exam:     Blood pressure 103/61, pulse 74, temperature 98 °F (36.7 °C), temperature source Oral, resp. rate 18, height 5' 4\" (1.626 m), weight 98 lb 6.4 oz (44.6 kg), SpO2 98 %. General:    Elderly, no distress, sleepy   CV:   RRR. No m/r/g. No jugular venous distension. No edema   Lungs:   CTAB. No wheezing, rhonchi, or rales. Symmetric expansion. Abdomen:   Soft, nontender, nondistended. Extremities: No cyanosis or clubbing. No edema  Skin:     No rashes and normal coloration. Warm and dry. Neuro:  grossly intact. Psych:  Normal mood and affect.       I have personally reviewed labs and tests:  Recent Labs:  Recent Results (from the past 48 hour(s))   PLEASE READ & DOCUMENT PPD TEST IN 48 HRS    Collection Time: 01/31/23 12:00 AM   Result Value Ref Range    PPD, (POC) Negative Negative    mm Induration 0 0 - 5 mm   CBC    Collection Time: 01/31/23  5:34 AM   Result Value Ref Range    WBC 6.5 4.3 - 11.1 K/uL    RBC 2.02 (L) 4.05 - 5.2 M/uL    Hemoglobin 5.9 (LL) 11.7 - 15.4 g/dL    Hematocrit 18.2 (L) 35.8 - 46.3 %    MCV 90.1 82 - 102 FL    MCH 29.2 26.1 - 32.9 PG    MCHC 32.4 31.4 - 35.0 g/dL    RDW 19.8 (H) 11.9 - 14.6 %    Platelets 629 275 - 431 K/uL    MPV 10.2 9.4 - 12.3 FL    nRBC 0.00 0.0 - 0.2 K/uL   Magnesium    Collection Time: 01/31/23  5:34 AM   Result Value Ref Range    Magnesium 2.2 1.8 - 2.4 mg/dL   Protime-INR    Collection Time: 01/31/23  5:34 AM   Result Value Ref Range    Protime 19.7 (H) 12.6 - 14.3 sec    INR 1.6     Hemoglobin and Hematocrit    Collection Time: 01/31/23  9:29 AM   Result Value Ref Range    Hemoglobin 6.1 (LL) 11.7 - 15.4 g/dL    Hematocrit 19.0 (L) 35.8 - 46.3 %   Ferritin    Collection Time: 01/31/23 10:40 AM   Result Value Ref Range    Ferritin 20 8 - 388 NG/ML   Iron    Collection Time: 01/31/23 10:40 AM   Result Value Ref Range    Iron 164 (H) 35 - 150 ug/dL   Lactate Dehydrogenase    Collection Time: 01/31/23 10:40 AM   Result Value Ref Range     (H) 110 - 210 U/L   Haptoglobin    Collection Time: 01/31/23 10:40 AM   Result Value Ref Range    Haptoglobin 49 30 - 200 mg/dL   Vitamin B12    Collection Time: 01/31/23 10:40 AM   Result Value Ref Range    Vitamin B-12 258 193 - 986 pg/mL   Basic Metabolic Panel w/ Reflex to MG    Collection Time: 01/31/23 10:40 AM   Result Value Ref Range    Sodium 132 (L) 133 - 143 mmol/L    Potassium 4.8 3.5 - 5.1 mmol/L    Chloride 99 (L) 101 - 110 mmol/L    CO2 27 21 - 32 mmol/L    Anion Gap 6 2 - 11 mmol/L    Glucose 90 65 - 100 mg/dL    BUN 26 (H) 8 - 23 MG/DL    Creatinine 0.90 0.6 - 1.0 MG/DL    Est, Glom Filt Rate >60 >60 ml/min/1.73m2    Calcium 8.9 8.3 - 10.4 MG/DL   Folate    Collection Time: 01/31/23 10:40 AM   Result Value Ref Range    Folate 10.3 3.1 - 17.5 ng/mL   PREPARE RBC (CROSSMATCH), 2 Units    Collection Time: 01/31/23 10:45 AM   Result Value Ref Range    History Check Historical check performed    Hemoglobin and Hematocrit    Collection Time: 01/31/23  9:18 PM   Result Value Ref Range    Hemoglobin 8.9 (L) 11.7 - 15.4 g/dL    Hematocrit 27.0 (L) 35.8 - 46.3 %   Basic Metabolic Panel w/ Reflex to MG    Collection Time: 02/01/23  7:09 AM   Result Value Ref Range    Sodium 135 133 - 143 mmol/L    Potassium 4.0 3.5 - 5.1 mmol/L    Chloride 106 101 - 110 mmol/L    CO2 25 21 - 32 mmol/L    Anion Gap 4 2 - 11 mmol/L    Glucose 85 65 - 100 mg/dL    BUN 16 8 - 23 MG/DL    Creatinine 0.80 0.6 - 1.0 MG/DL    Est, Glom Filt Rate >60 >60 ml/min/1.73m2    Calcium 8.9 8.3 - 10.4 MG/DL   CBC with Auto Differential    Collection Time: 02/01/23  7:09 AM   Result Value Ref Range    WBC 7.8 4.3 - 11.1 K/uL    RBC 3.40 (L) 4.05 - 5.2 M/uL    Hemoglobin 9.5 (L) 11.7 - 15.4 g/dL    Hematocrit 29.1 (L) 35.8 - 46.3 %    MCV 85.6 82 - 102 FL    MCH 27.9 26.1 - 32.9 PG    MCHC 32.6 31.4 - 35.0 g/dL    RDW 23.0 (H) 11.9 - 14.6 %    Platelets 123 932 - 839 K/uL    MPV 10.1 9.4 - 12.3 FL    nRBC 0.00 0.0 - 0.2 K/uL    Differential Type AUTOMATED      Seg Neutrophils 67 43 - 78 %    Lymphocytes 16 13 - 44 %    Monocytes 15 (H) 4.0 - 12.0 %    Eosinophils % 1 0.5 - 7.8 %    Basophils 0 0.0 - 2.0 %    Immature Granulocytes 1 0.0 - 5.0 %    Segs Absolute 5.2 1.7 - 8.2 K/UL    Absolute Lymph # 1.3 0.5 - 4.6 K/UL    Absolute Mono # 1.2 0.1 - 1.3 K/UL    Absolute Eos # 0.1 0.0 - 0.8 K/UL    Basophils Absolute 0.0 0.0 - 0.2 K/UL    Absolute Immature Granulocyte 0.1 0.0 - 0.5 K/UL       I have personally reviewed imaging studies:  Other Studies:  XR CHEST PORTABLE   Final Result      1. No evidence of pneumonia or pulmonary edema.              Current Meds:  Current Facility-Administered Medications   Medication Dose Route Frequency    pantoprazole (PROTONIX) 40 mg in sodium chloride (PF) 0.9 % 10 mL injection  40 mg IntraVENous Daily    0.9 % sodium chloride infusion   IntraVENous PRN    sodium chloride flush 0.9 % injection 5-40 mL  5-40 mL IntraVENous 2 times per day    sodium chloride flush 0.9 % injection 5-40 mL  5-40 mL IntraVENous PRN    0.9 % sodium chloride infusion   IntraVENous PRN    ondansetron (ZOFRAN-ODT) disintegrating tablet 4 mg  4 mg Oral Q8H PRN    Or    ondansetron (ZOFRAN) injection 4 mg  4 mg IntraVENous Q6H PRN    acetaminophen (TYLENOL) tablet 650 mg  650 mg Oral Q6H PRN    Or    acetaminophen (TYLENOL) suppository 650 mg  650 mg Rectal Q6H PRN    polyethylene glycol (GLYCOLAX) packet 17 g  17 g Oral Daily PRN    aspirin chewable tablet 81 mg  81 mg Oral Daily    potassium chloride (KLOR-CON M) extended release tablet 40 mEq  40 mEq Oral PRN    Or    potassium bicarb-citric acid (EFFER-K) effervescent tablet 40 mEq  40 mEq Oral PRN    Or    potassium chloride 10 mEq/100 mL IVPB (Peripheral Line)  10 mEq IntraVENous PRN    magnesium sulfate 2000 mg in 50 mL IVPB premix  2,000 mg IntraVENous PRN    aluminum & magnesium hydroxide-simethicone (MAALOX) 200-200-20 MG/5ML suspension 30 mL  30 mL Oral Q6H PRN    nitroGLYCERIN (NITROSTAT) SL tablet 0.4 mg  0.4 mg SubLINGual Q5 Min PRN    morphine (PF) injection 2 mg  2 mg IntraVENous Q2H PRN    Or    morphine injection 4 mg  4 mg IntraVENous Q2H PRN    atorvastatin (LIPITOR) tablet 40 mg  40 mg Oral Daily    ferrous sulfate (IRON 325) tablet 325 mg  325 mg Oral Daily with breakfast    mometasone-formoterol (DULERA) 100-5 MCG/ACT inhaler 2 puff  2 puff Inhalation BID    metoprolol succinate (TOPROL XL) extended release tablet 25 mg  25 mg Oral Daily    sacubitril-valsartan (ENTRESTO) 24-26 MG per tablet 1 tablet  1 tablet Oral BID    venlafaxine (EFFEXOR) tablet 37.5 mg  37.5 mg Oral Daily    traMADol (ULTRAM) tablet 50 mg  50 mg Oral Q6H PRN       Signed:  Alexander Colunga MD    Part of this note may have been written by using a voice dictation software. The note has been proof read but may still contain some grammatical/other typographical errors.

## 2023-02-02 VITALS
SYSTOLIC BLOOD PRESSURE: 103 MMHG | DIASTOLIC BLOOD PRESSURE: 50 MMHG | RESPIRATION RATE: 18 BRPM | HEIGHT: 64 IN | BODY MASS INDEX: 17.75 KG/M2 | TEMPERATURE: 98.1 F | HEART RATE: 59 BPM | OXYGEN SATURATION: 100 % | WEIGHT: 104 LBS

## 2023-02-02 LAB
ANION GAP SERPL CALC-SCNC: 7 MMOL/L (ref 2–11)
BASOPHILS # BLD: 0 K/UL (ref 0–0.2)
BASOPHILS NFR BLD: 1 % (ref 0–2)
BUN SERPL-MCNC: 12 MG/DL (ref 8–23)
CALCIUM SERPL-MCNC: 8.2 MG/DL (ref 8.3–10.4)
CHLORIDE SERPL-SCNC: 108 MMOL/L (ref 101–110)
CO2 SERPL-SCNC: 28 MMOL/L (ref 21–32)
CREAT SERPL-MCNC: 0.8 MG/DL (ref 0.6–1)
DIFFERENTIAL METHOD BLD: ABNORMAL
EOSINOPHIL # BLD: 0.1 K/UL (ref 0–0.8)
EOSINOPHIL NFR BLD: 2 % (ref 0.5–7.8)
ERYTHROCYTE [DISTWIDTH] IN BLOOD BY AUTOMATED COUNT: 22.7 % (ref 11.9–14.6)
GLUCOSE SERPL-MCNC: 84 MG/DL (ref 65–100)
HCT VFR BLD AUTO: 25.9 % (ref 35.8–46.3)
HGB BLD-MCNC: 8.6 G/DL (ref 11.7–15.4)
IMM GRANULOCYTES # BLD AUTO: 0 K/UL (ref 0–0.5)
IMM GRANULOCYTES NFR BLD AUTO: 1 % (ref 0–5)
LYMPHOCYTES # BLD: 1.3 K/UL (ref 0.5–4.6)
LYMPHOCYTES NFR BLD: 22 % (ref 13–44)
MCH RBC QN AUTO: 28.5 PG (ref 26.1–32.9)
MCHC RBC AUTO-ENTMCNC: 33.2 G/DL (ref 31.4–35)
MCV RBC AUTO: 85.8 FL (ref 82–102)
MONOCYTES # BLD: 1 K/UL (ref 0.1–1.3)
MONOCYTES NFR BLD: 17 % (ref 4–12)
NEUTS SEG # BLD: 3.4 K/UL (ref 1.7–8.2)
NEUTS SEG NFR BLD: 57 % (ref 43–78)
NRBC # BLD: 0 K/UL (ref 0–0.2)
PATH REV BLD -IMP: NORMAL
PLATELET # BLD AUTO: 264 K/UL (ref 150–450)
PMV BLD AUTO: 10.2 FL (ref 9.4–12.3)
POTASSIUM SERPL-SCNC: 4.4 MMOL/L (ref 3.5–5.1)
RBC # BLD AUTO: 3.02 M/UL (ref 4.05–5.2)
SODIUM SERPL-SCNC: 143 MMOL/L (ref 133–143)
WBC # BLD AUTO: 5.9 K/UL (ref 4.3–11.1)

## 2023-02-02 PROCEDURE — 2580000003 HC RX 258: Performed by: INTERNAL MEDICINE

## 2023-02-02 PROCEDURE — 80048 BASIC METABOLIC PNL TOTAL CA: CPT

## 2023-02-02 PROCEDURE — 6360000002 HC RX W HCPCS: Performed by: INTERNAL MEDICINE

## 2023-02-02 PROCEDURE — 85025 COMPLETE CBC W/AUTO DIFF WBC: CPT

## 2023-02-02 PROCEDURE — C9113 INJ PANTOPRAZOLE SODIUM, VIA: HCPCS | Performed by: INTERNAL MEDICINE

## 2023-02-02 PROCEDURE — A4216 STERILE WATER/SALINE, 10 ML: HCPCS | Performed by: INTERNAL MEDICINE

## 2023-02-02 PROCEDURE — 6370000000 HC RX 637 (ALT 250 FOR IP): Performed by: NURSE PRACTITIONER

## 2023-02-02 PROCEDURE — 2580000003 HC RX 258: Performed by: NURSE PRACTITIONER

## 2023-02-02 PROCEDURE — 36415 COLL VENOUS BLD VENIPUNCTURE: CPT

## 2023-02-02 RX ORDER — ASPIRIN 81 MG/1
81 TABLET, CHEWABLE ORAL DAILY
Qty: 30 TABLET | Refills: 0 | Status: SHIPPED | OUTPATIENT
Start: 2023-02-02

## 2023-02-02 RX ORDER — PANTOPRAZOLE SODIUM 40 MG/1
40 TABLET, DELAYED RELEASE ORAL DAILY
Qty: 30 TABLET | Refills: 0 | Status: SHIPPED | OUTPATIENT
Start: 2023-02-02

## 2023-02-02 RX ADMIN — ATORVASTATIN CALCIUM 40 MG: 40 TABLET, FILM COATED ORAL at 09:32

## 2023-02-02 RX ADMIN — FERROUS SULFATE TAB 325 MG (65 MG ELEMENTAL FE) 325 MG: 325 (65 FE) TAB at 09:32

## 2023-02-02 RX ADMIN — VENLAFAXINE 37.5 MG: 25 TABLET ORAL at 09:32

## 2023-02-02 RX ADMIN — ASPIRIN 81 MG: 81 TABLET, CHEWABLE ORAL at 09:32

## 2023-02-02 RX ADMIN — METOPROLOL SUCCINATE 25 MG: 25 TABLET, EXTENDED RELEASE ORAL at 09:32

## 2023-02-02 RX ADMIN — SODIUM CHLORIDE 40 MG: 9 INJECTION, SOLUTION INTRAMUSCULAR; INTRAVENOUS; SUBCUTANEOUS at 09:32

## 2023-02-02 RX ADMIN — SODIUM CHLORIDE, PRESERVATIVE FREE 10 ML: 5 INJECTION INTRAVENOUS at 09:37

## 2023-02-02 RX ADMIN — SACUBITRIL AND VALSARTAN 1 TABLET: 24; 26 TABLET, FILM COATED ORAL at 09:32

## 2023-02-02 ASSESSMENT — PAIN SCALES - GENERAL
PAINLEVEL_OUTOF10: 0
PAINLEVEL_OUTOF10: 0

## 2023-02-02 NOTE — CARE COORDINATION
LOS 4D  CM met with patient and her spouse this AM with discharge order placed. Patient's Hgb stable after transfusion. PT/OT recommending home health. Spouse politely declines home health. Disposition: Home with spouse assistance.

## 2023-02-02 NOTE — DISCHARGE SUMMARY
Hospitalist Discharge Summary   Admit Date:  2023  3:28 AM   DC Note date: 2023  Name:  Linda Hendricks   Age:  65 y.o. Sex:  female  :  1945   MRN:  [id number]   Room:  [facility]  PCP:  PROVIDER Shady Nichole MD    Presenting Complaint: No chief complaint on file. Initial Admission Diagnosis: Chest pain [R07.9]     Problem List for this Hospitalization (present on admission):    Principal Problem:    Chest pain  Active Problems:    Weakness    HLD (hyperlipidemia)    CAD (coronary artery disease)    S/P CABG x 3    PAF (paroxysmal atrial fibrillation) (HCC)    Warfarin anticoagulation    Supratherapeutic INR    Confusion    CHF (congestive heart failure) ([address]  Resolved Problems:    * No resolved hospital problems. *      Hospital Course:       Linda Hendricks is a 65 y.o. female with medical history of dementia, CAD s/p CABG, pAFIB on coumadin, CHF admitted from [facility], [place], [place] to cardiology for chest pain workup. ACS ruled out by cardiology and care transferred to our service due to acute blood loss anemia. HGB declined to 5.9. s/p 2 PRBC. GI consulted. Discharge HGB 8.6. she will need followup with local GI and regular CBC monitoring. GI has chosen to not do endoscopies at this time. I did discuss close followup with her  who agrees to have this done in [place], [place]. Coumadin on hold. INR > 9.0 reversed with vitamin k . I did not resume coumadin at discharge due to her bleed risk. This was discussed with her . She will need local cardiology and Pcp followup as well. Discharge plans  to home with . Disposition: home        Diet: ADULT DIET; Regular; 4 carb choices (60 gm/meal); Low Fat/Low Chol/High Fiber/2 gm Na; No Caffeine, No red dye  Code Status: Full Code    Follow Ups:      GI and cardiology [place], [place]  PCP  CBC 1 week    Time spent in patient discharge and coordination 35  minutes.         Follow up labs/diagnostics (ultimately defer to outpatient provider): Above     Plan was discussed with  . All questions answered. Patient was stable at time of discharge. Instructions given to call a physician or return if any concerns. Current Discharge Medication List        START taking these medications    Details   aspirin 81 MG chewable tablet Take 1 tablet by mouth daily  Qty: 30 tablet, Refills: 0      mometasone-formoterol (DULERA) 100-5 MCG/ACT inhaler Inhale 2 puffs into the lungs in the morning and 2 puffs in the evening. Qty: 1 each, Refills: 0      pantoprazole (PROTONIX) 40 MG tablet Take 1 tablet by mouth daily  Qty: 30 tablet, Refills: 0           CONTINUE these medications which have NOT CHANGED    Details   atorvastatin (LIPITOR) 40 MG tablet Take 40 mg by mouth daily      sacubitril-valsartan (ENTRESTO) 24-26 MG per tablet Take by mouth in the morning and at bedtime      ferrous sulfate (IRON 325) 325 (65 Fe) MG tablet Take 325 mg by mouth daily (with breakfast)      metoprolol succinate (TOPROL XL) 25 MG extended release tablet Take 25 mg by mouth daily      venlafaxine (EFFEXOR) 37.5 MG tablet Take 37.5 mg by mouth daily           STOP taking these medications       fluticasone-salmeterol (ADVAIR) 100-50 MCG/ACT AEPB diskus inhaler Comments:   Reason for Stopping:         digoxin (LANOXIN) 125 MCG tablet Comments:   Reason for Stopping:         furosemide (LASIX) 20 MG tablet Comments:   Reason for Stopping:         spironolactone (ALDACTONE) 25 MG tablet Comments:   Reason for Stopping:               Some medications may have been reported old/obsolete and marked \"stop taking\" by the system; in reality pt was already off these meds; defer to outpatient or prescribing providers.     Procedures done this admission:  * No surgery found *    Consults this admission:  IP CONSULT TO HOSPITALIST  IP CONSULT TO GI    Echocardiogram results:  01/29/23    TRANSTHORACIC ECHOCARDIOGRAM (TTE) COMPLETE (CONTRAST/BUBBLE/3D PRN) 01/29/2023  5:01 PM, 01/29/2023 12:00 AM (Final)    Interpretation Summary    Left Ventricle: Normal left ventricular systolic function with a visually estimated EF of 50 - 55%. Left ventricle size is normal. Normal wall thickness. Normal wall motion. Normal diastolic function. Technical qualifiers: Technically difficult study due to patient's heart rhythm, color flow Doppler was performed and pulse wave and/or continuous wave Doppler was performed. Contrast used: Definity. Signed by: Ciara Olmedo MD on 1/29/2023  5:01 PM, Signed by: Unknown Provider Result on 1/29/2023 12:00 AM      Diagnostic Imaging/Tests:   XR CHEST PORTABLE    Result Date: 1/29/2023  1. No evidence of pneumonia or pulmonary edema.         Labs: Results:       BMP, Mg, Phos Recent Labs     01/31/23  0534 01/31/23  1040 02/01/23  0709 02/02/23  0517   NA  --  132* 135 143   K  --  4.8 4.0 4.4   CL  --  99* 106 108   CO2  --  27 25 28   ANIONGAP  --  6 4 7   BUN  --  26* 16 12   CREATININE  --  0.90 0.80 0.80   LABGLOM  --  >60 >60 >60   CALCIUM  --  8.9 8.9 8.2*   GLUCOSE  --  90 85 84   MG 2.2  --   --   --       CBC Recent Labs     01/31/23  0534 01/31/23  0929 01/31/23  2118 02/01/23  0709 02/02/23  0517   WBC 6.5  --   --  7.8 5.9   RBC 2.02*  --   --  3.40* 3.02*   HGB 5.9*   < > 8.9* 9.5* 8.6*   HCT 18.2*   < > 27.0* 29.1* 25.9*   MCV 90.1  --   --  85.6 85.8   MCH 29.2  --   --  27.9 28.5   MCHC 32.4  --   --  32.6 33.2   RDW 19.8*  --   --  23.0* 22.7*     --   --  248 264   MPV 10.2  --   --  10.1 10.2   NRBC 0.00  --   --  0.00 0.00   SEGS  --   --   --  67 57   LYMPHOPCT  --   --   --  16 22   EOSRELPCT  --   --   --  1 2   MONOPCT  --   --   --  15* 17*   BASOPCT  --   --   --  0 1   IMMGRAN  --   --   --  1 1   SEGSABS  --   --   --  5.2 3.4   LYMPHSABS  --   --   --  1.3 1.3   EOSABS  --   --   --  0.1 0.1   MONOSABS  --   --   --  1.2 1.0   BASOSABS  --   --   --  0.0 0.0   ABSIMMGRAN  -- --   --  0.1 0.0    < > = values in this interval not displayed. LFT No results for input(s): BILITOT, BILIDIR, ALKPHOS, AST, ALT, PROT, LABALBU, GLOB in the last 72 hours. Cardiac  No results found for: NTPROBNP, TROPHS   Coags Lab Results   Component Value Date/Time    PROTIME 19.7 01/31/2023 05:34 AM    PROTIME 86.0 01/30/2023 05:36 AM    PROTIME 66.9 01/29/2023 05:07 AM    INR 1.6 01/31/2023 05:34 AM    INR >9.0 01/30/2023 05:36 AM    INR 7.8 01/29/2023 05:07 AM      A1c Lab Results   Component Value Date/Time    LABA1C 5.4 01/29/2023 05:07 AM     01/29/2023 05:07 AM      Lipids Lab Results   Component Value Date/Time    CHOL 103 01/29/2023 05:07 AM    LDLCALC 45.6 01/29/2023 05:07 AM    LABVLDL 11.4 01/29/2023 05:07 AM    HDL 46 01/29/2023 05:07 AM    CHOLHDLRATIO 2.2 01/29/2023 05:07 AM    TRIG 57 01/29/2023 05:07 AM      Thyroid  Lab Results   Component Value Date/Time    TSHELE 4.50 01/29/2023 05:07 AM        Most Recent UA Lab Results   Component Value Date/Time    COLORU YELLOW/STRAW 01/29/2023 12:13 PM    APPEARANCE CLEAR 01/29/2023 12:13 PM    SPECGRAV 1.020 01/29/2023 12:13 PM    LABPH 5.0 01/29/2023 12:13 PM    PROTEINU Negative 01/29/2023 12:13 PM    GLUCOSEU Negative 01/29/2023 12:13 PM    KETUA Negative 01/29/2023 12:13 PM    BILIRUBINUR Negative 01/29/2023 12:13 PM    BLOODU Negative 01/29/2023 12:13 PM    UROBILINOGEN 0.2 01/29/2023 12:13 PM    NITRU Negative 01/29/2023 12:13 PM    LEUKOCYTESUR Negative 01/29/2023 12:13 PM    WBCUA 0-3 01/29/2023 12:13 PM    RBCUA 0 01/29/2023 12:13 PM    EPITHUA 0-3 01/29/2023 12:13 PM    BACTERIA TRACE 01/29/2023 12:13 PM    LABCAST 0 01/29/2023 12:13 PM    MUCUS 0 01/29/2023 12:13 PM        No results for input(s): CULTURE in the last 720 hours.     All Labs from Last 24 Hrs:  Recent Results (from the past 24 hour(s))   PLEASE READ & DOCUMENT PPD TEST IN 72 HRS    Collection Time: 02/01/23  5:25 PM   Result Value Ref Range    PPD, (POC) Negative Negative    mm Induration 0 0 - 5 mm   Basic Metabolic Panel w/ Reflex to MG    Collection Time: 02/02/23  5:17 AM   Result Value Ref Range    Sodium 143 133 - 143 mmol/L    Potassium 4.4 3.5 - 5.1 mmol/L    Chloride 108 101 - 110 mmol/L    CO2 28 21 - 32 mmol/L    Anion Gap 7 2 - 11 mmol/L    Glucose 84 65 - 100 mg/dL    BUN 12 8 - 23 MG/DL    Creatinine 0.80 0.6 - 1.0 MG/DL    Est, Glom Filt Rate >60 >60 ml/min/1.73m2    Calcium 8.2 (L) 8.3 - 10.4 MG/DL   CBC with Auto Differential    Collection Time: 02/02/23  5:17 AM   Result Value Ref Range    WBC 5.9 4.3 - 11.1 K/uL    RBC 3.02 (L) 4.05 - 5.2 M/uL    Hemoglobin 8.6 (L) 11.7 - 15.4 g/dL    Hematocrit 25.9 (L) 35.8 - 46.3 %    MCV 85.8 82 - 102 FL    MCH 28.5 26.1 - 32.9 PG    MCHC 33.2 31.4 - 35.0 g/dL    RDW 22.7 (H) 11.9 - 14.6 %    Platelets 915 280 - 032 K/uL    MPV 10.2 9.4 - 12.3 FL    nRBC 0.00 0.0 - 0.2 K/uL    Differential Type AUTOMATED      Seg Neutrophils 57 43 - 78 %    Lymphocytes 22 13 - 44 %    Monocytes 17 (H) 4.0 - 12.0 %    Eosinophils % 2 0.5 - 7.8 %    Basophils 1 0.0 - 2.0 %    Immature Granulocytes 1 0.0 - 5.0 %    Segs Absolute 3.4 1.7 - 8.2 K/UL    Absolute Lymph # 1.3 0.5 - 4.6 K/UL    Absolute Mono # 1.0 0.1 - 1.3 K/UL    Absolute Eos # 0.1 0.0 - 0.8 K/UL    Basophils Absolute 0.0 0.0 - 0.2 K/UL    Absolute Immature Granulocyte 0.0 0.0 - 0.5 K/UL       Allergies   Allergen Reactions    Tetanus Toxoids      Immunization History   Administered Date(s) Administered    PPD Test 01/29/2023       Recent Vital Data:  Patient Vitals for the past 24 hrs:   Temp Pulse Resp BP SpO2   02/02/23 0738 98.1 °F (36.7 °C) 59 18 (!) 103/50 100 %   02/02/23 0415 98.2 °F (36.8 °C) 55 18 (!) 122/51 97 %   02/01/23 2345 98.1 °F (36.7 °C) 63 16 (!) 119/53 98 %   02/01/23 2030 97.5 °F (36.4 °C) 52 16 (!) 131/58 100 %   02/01/23 2025 -- 58 16 -- 97 %   02/01/23 1946 -- -- -- (!) 128/54 --   02/01/23 1615 97.6 °F (36.4 °C) 61 18 (!) 117/50 100 %   02/01/23 1213 98 °F (36.7 °C) 74 18 103/61 98 %       Oxygen Therapy  SpO2: 100 %  Pulse Oximeter Device Mode: Intermittent  O2 Device: None (Room air)    Estimated body mass index is 17.85 kg/m² as calculated from the following:    Height as of this encounter: 5' 4\" (1.626 m). Weight as of this encounter: 104 lb (47.2 kg). Intake/Output Summary (Last 24 hours) at 2/2/2023 1117  Last data filed at 2/2/2023 0433  Gross per 24 hour   Intake --   Output 400 ml   Net -400 ml         Physical Exam:    General:    Elderly, no distress, alert and calm   CV:   RRR. No m/r/g. No JVD, no edema   Lungs:   CTAB. No wheezing, rhonchi, or rales. Respirations even, unlabored  Abdomen:   Soft, nontender, nondistended. Extremities: Warm and dry. No cyanosis or clubbing. No edema. Skin:     No rashes. Normal coloration  Neuro:  grossly intact. Psych:  Normal mood and affect. Signed:  Netta Aguero MD    Part of this note may have been written by using a voice dictation software. The note has been proof read but may still contain some grammatical/other typographical errors.

## 2023-02-02 NOTE — PLAN OF CARE
Problem: Discharge Planning  Goal: Discharge to home or other facility with appropriate resources  Outcome: Adequate for Discharge     Problem: Safety - Adult  Goal: Free from fall injury  Outcome: Adequate for Discharge     Problem: ABCDS Injury Assessment  Goal: Absence of physical injury  Outcome: Adequate for Discharge     Problem: Pain  Goal: Verbalizes/displays adequate comfort level or baseline comfort level  Outcome: Adequate for Discharge     Problem: Respiratory - Adult  Goal: Achieves optimal ventilation and oxygenation  Outcome: Adequate for Discharge     Problem: Chronic Conditions and Co-morbidities  Goal: Patient's chronic conditions and co-morbidity symptoms are monitored and maintained or improved  Outcome: Adequate for Discharge

## 2023-02-02 NOTE — DISCHARGE INSTRUCTIONS
Gastroesophageal Reflux Disease (GERD): Care Instructions  Overview     Gastroesophageal reflux disease (GERD) is the backward flow of stomach acid into the esophagus. The esophagus is the tube that leads from your throat to your stomach. A one-way valve prevents the stomach acid from backing up into this tube. But when you have GERD, this valve does not close tightly enough. This can also cause pain and swelling in your esophagus. (This is called esophagitis.)  If you have mild GERD symptoms including heartburn, you may be able to control the problem with antacids or over-the-counter medicine. You can also make lifestyle changes to help reduce your symptoms. These include changing your diet and eating habits, such as not eating late at night and losing weight. Follow-up care is a key part of your treatment and safety. Be sure to make and go to all appointments, and call your doctor if you are having problems. It's also a good idea to know your test results and keep a list of the medicines you take. How can you care for yourself at home? Take your medicines exactly as prescribed. Call your doctor if you think you are having a problem with your medicine. Your doctor may recommend over-the-counter medicine. For mild or occasional indigestion, antacids, such as Tums, Gaviscon, Mylanta, or Maalox, may help. Your doctor also may recommend over-the-counter acid reducers, such as famotidine (Pepcid AC), cimetidine (Tagamet HB), or omeprazole (Prilosec). Read and follow all instructions on the label. If you use these medicines often, talk with your doctor. Change your eating habits. It's best to eat several small meals instead of two or three large meals. After you eat, wait 2 to 3 hours before you lie down. Avoid foods that make your symptoms worse. These may include chocolate, mint, alcohol, pepper, spicy foods, high-fat foods, or drinks with caffeine in them, such as tea, coffee, joshua, or energy drinks.  If your symptoms are worse after you eat a certain food, you may want to stop eating it to see if your symptoms get better. Do not smoke or chew tobacco. Smoking can make GERD worse. If you need help quitting, talk to your doctor about stop-smoking programs and medicines. These can increase your chances of quitting for good. If you have GERD symptoms at night, raise the head of your bed 6 to 8 inches by putting the frame on blocks or placing a foam wedge under the head of your mattress. (Adding extra pillows does not work.)  Do not wear tight clothing around your middle. Lose weight if you need to. Losing just 5 to 10 pounds can help. When should you call for help? Call your doctor now or seek immediate medical care if:    You have new or different belly pain. Your stools are black and tarlike or have streaks of blood. Watch closely for changes in your health, and be sure to contact your doctor if:    Your symptoms have not improved after 2 days. Food seems to catch in your throat or chest.   Where can you learn more? Go to http://Art Circle.woods.com/ and enter T927 to learn more about \"Gastroesophageal Reflux Disease (GERD): Care Instructions. \"  Current as of: June 6, 2022               Content Version: 13.5  © 2006-2022 [employer] Care instructions adapted under license by [facility] ([facility]). If you have questions about a medical condition or this instruction, always ask your healthcare professional. [place] [address] any warranty or liability for your use of this information. Chest Pain: Care Instructions  Overview     There are many things that can cause chest pain. Some are not serious and will get better on their own in a few days. But some kinds of chest pain need more testing and treatment. Your doctor may have recommended a follow-up visit in the next few days. If you are not getting better, you may need more tests or treatment.   Even though your doctor has released you, you still need to watch for any problems. The doctor carefully checked you, but sometimes problems can develop later. If you have new symptoms or if your symptoms do not get better, get medical care right away. If you have worse or different chest pain or pressure that lasts more than 5 minutes or you passed out (lost consciousness), call 911 or seek other emergency help right away. A medical visit is only one step in your treatment. Even if you feel better, you still need to do what your doctor recommends, such as going to all suggested follow-up appointments and taking medicines exactly as directed. This will help you recover and help prevent future problems. How can you care for yourself at home? Rest until you feel better. Take your medicine exactly as prescribed. Call your doctor if you think you are having a problem with your medicine. Do not drive after taking a prescription pain medicine. When should you call for help? Call 911 if: You passed out (lost consciousness). You have severe difficulty breathing. You have symptoms of a heart attack. These may include:  Chest pain or pressure, or a strange feeling in your chest.  Sweating. Shortness of breath. Nausea or vomiting. Pain, pressure, or a strange feeling in your back, neck, jaw, or upper belly or in one or both shoulders or arms. Lightheadedness or sudden weakness. A fast or irregular heartbeat. After you call 911, the  may tell you to chew 1 adult-strength or 2 to 4 low-dose aspirin. Wait for an ambulance. Do not try to drive yourself. Call your doctor now or seek immediate medical care if:    You have any trouble breathing. You have new or different chest pain. You are dizzy or lightheaded, or you feel like you may faint. Watch closely for changes in your health, and be sure to contact your doctor if you do not get better as expected. Where can you learn more?   Go to http://www.Descubre.la/ and enter A120 to learn more about \"Chest Pain: Care Instructions. \"  Current as of: February 23, 2022               Content Version: 13.5  © 2006-2022 [employer]. Care instructions adapted under license by [facility] ([facility]). If you have questions about a medical condition or this instruction, always ask your healthcare professional. [address] any warranty or liability for your use of this information. aspirin (oral)  Pronunciation:  AS pir in  Brand:  Arthritis Pain, Aspi-Cor, Aspir-Low, Marty Plus, Durlaza, Ecotrin, Miniprin, V[place]alore  What is the most important information I should know about aspirin? Aspirin can cause Reye's syndrome, a serious and sometimes fatal condition in children. What is aspirin? Aspirin is a salicylate (kr-RJL-gz-ate) that is used to treat pain, and reduce fever or inflammation. Aspirin is sometimes used to treat or prevent heart attacks, strokes, and chest pain (angina). Aspirin should be used for these conditions only under the supervision of a doctor. Aspirin may also be used for purposes not listed in this medication guide. What should I discuss with my healthcare provider before taking aspirin? Using aspirin in a child or teenager with flu symptoms or chickenpox can cause a serious or fatal condition called Reye's syndrome. You should not use aspirin if you are allergic to it, or if you have:  a recent history of stomach or intestinal bleeding;  a bleeding disorder such as hemophilia; or  if you have ever had an asthma attack or severe allergic reaction after taking aspirin or an NSAID (non-steroidal anti-inflammatory drug). Tell your doctor if you have ever had:  asthma or seasonal allergies;  stomach ulcers;  liver disease;  kidney disease;  a bleeding or blood clotting disorder;  gout; or  heart disease, high blood pressure, or congestive heart failure.   Taking aspirin during late pregnancy may cause bleeding in the mother or the baby during delivery. Tell your doctor if you are pregnant or plan to become pregnant. You should not breastfeed while using this medicine. How should I take aspirin? Use exactly as directed on the label, or as prescribed by your doctor. Always follow directions on the medicine label about giving aspirin to a child. Take with food if aspirin upsets your stomach. You must chew the chewable tablet before you swallow it. Do not crush, chew, break, or open an enteric-coated  or delayed/extended-release pill. Swallow it whole. Tell your doctor if you have a planned surgery. Store at room temperature away from moisture and heat. Do not use aspirin if you smell a strong vinegar odor in the aspirin bottle. The medicine may no longer be effective. What happens if I miss a dose? Aspirin is used when needed. If you are on a dosing schedule, skip any missed dose. Do not use two doses at one time. What happens if I overdose? Seek emergency medical attention or call the Poison Help line at 1-967.584.9975. Overdose may cause stomach pain, vomiting, diarrhea, vision or hearing problems, fast or slow breathing, or confusion. What should I avoid while taking aspirin? Avoid alcohol. Heavy drinking can increase your risk of stomach bleeding. Avoid taking ibuprofen if you take aspirin to prevent stroke or heart attack. Ibuprofen can make aspirin less effective in protecting your heart and blood vessels. Ask your doctor how far apart your doses should be. Ask a doctor or pharmacist before using other medicines for pain, fever, swelling, or cold/flu symptoms. They may contain ingredients similar to aspirin (such as magnesium salicylate, ibuprofen, ketoprofen, or naproxen). What are the possible side effects of aspirin? Get emergency medical help if you have signs of an allergic reaction: hives; difficult breathing; swelling of your face, lips, tongue, or throat.   Stop using aspirin and call your doctor at once if you have:  ringing in your ears, confusion, hallucinations, rapid breathing, seizure (convulsions);  severe nausea, vomiting, or stomach pain;  bloody or tarry stools, coughing up blood or vomit that looks like coffee grounds;  fever lasting longer than 3 days; or  swelling, or pain lasting longer than 10 days. Common side effects may include:  upset stomach, heartburn;  drowsiness; or  mild headache. This is not a complete list of side effects and others may occur. Call your doctor for medical advice about side effects. You may report side effects to FDA at 5-601-HVU-2125. What other drugs will affect aspirin? Ask your doctor before using aspirin if you take an antidepressant. Taking certain antidepressants with aspirin may cause you to bruise or bleed easily. Ask a doctor or pharmacist before using aspirin with any other medications, especially:  a blood thinner (warfarin, Coumadin, Cathern Robbin), or other medication used to prevent blood clots; or  other salicylates such as Nuprin Backache Caplet, Kaopectate, KneeRelief, Pamprin Cramp Formula, Pepto-Bismol, Tricosal, Trilisate, and others. This list is not complete. Other drugs may affect aspirin, including prescription and over-the-counter medicines, vitamins, and herbal products. Not all possible drug interactions are listed here. Where can I get more information? Your pharmacist can provide more information about aspirin. Remember, keep this and all other medicines out of the reach of children, never share your medicines with others, and use this medication only for the indication prescribed. Every effort has been made to ensure that the information provided by [address] is accurate, up-to-date, and complete, but no guarantee is made to that effect. Drug information contained herein may be time sensitive.  [employer] information has been compiled for use by healthcare practitioners and consumers in the [place] and therefore [employer] does not warrant that uses outside of the [place] are appropriate, unless specifically indicated otherwise. [facility]'s drug information does not endorse drugs, diagnose patients or recommend therapy. [facility] drug information is an informational resource designed to assist licensed healthcare practitioners in caring for their patients and/or to serve consumers viewing this service as a supplement to, and not a substitute for, the expertise, skill, knowledge and judgment of healthcare practitioners. The absence of a warning for a given drug or drug combination in no way should be construed to indicate that the drug or drug combination is safe, effective or appropriate for any given patient. [facility] does not assume any responsibility for any aspect of healthcare administered with the aid of information [facility] provides. The information contained herein is not intended to cover all possible uses, directions, precautions, warnings, drug interactions, allergic reactions, or adverse effects. If you have questions about the drugs you are taking, check with your doctor, nurse or pharmacist.  Copyright 4776-4236 [address] [place] [address] Dr BONILLA. Version: 16.03. Revision date: 6/28/2021. Care instructions adapted under license by [facility] ([facility]). If you have questions about a medical condition or this instruction, always ask your healthcare professional. [place] [address] any warranty or liability for your use of this information. formoterol and mometasone  Pronunciation:  for ZORAN ter ol and zoran MET a sone  Brand:  Katey Lombardi  What is the most important information I should know about formoterol and mometasone? Formoterol and mometasone is not a rescue medicine for asthma attacks. Seek medical attention if your breathing problems get worse quickly, or if you think your asthma medications are not working as well. What is formoterol and mometasone?   Formoterol is a long-acting bronchodilator. Mometasone is a steroid. Formoterol and mometasone is a combination medicine used to control and prevent the symptoms of asthma in adults and children at least 11years old. Formoterol when used alone may increase the risk of death in people with asthma. However, this risk is not increased when formoterol and mometasone are used together as a combination product. Formoterol and mometasone may also be used for purposes not listed in this medication guide. What should I discuss with my healthcare provider before using formoterol and mometasone? You should not use this medicine if you are allergic to formoterol or mometasone. Mometasone can weaken your immune system. Tell your doctor about any illness or infection you've had within the past several weeks. Tell your doctor if you have ever had:  heart disease, high blood pressure;  a seizure;  a weak immune system;  liver disease;  osteoporosis;  glaucoma, cataracts, or other vision problems;  diabetes;  a drug allergy;  pheochromocytoma (tumor of the adrenal gland);  a thyroid disorder; or  an aneurysm (a weakened or damaged blood vessel that could tear and cause severe bleeding). Tell your doctor if you are pregnant. It is not known whether this medicine will harm an unborn baby. However, having untreated or uncontrolled asthma during pregnancy may cause complications such as low birth weight, premature birth, or eclampsia (dangerously high blood pressure that can lead to medical problems in both mother and baby). The benefit of treating asthma may outweigh any risks to the baby. It may not be safe to breast-feed while using this medicine. Ask your doctor about any risk. Formoterol and mometasone is not approved for use by anyone younger than 11years old. How should I use formoterol and mometasone? Follow all directions on your prescription label and read all medication guides. Use the medicine exactly as directed.  Using too much of this medicine can cause life-threatening side effects. Formoterol and mometasone is not a rescue medicine for asthma attacks. Use only fast-acting inhalation medicine for an attack. Seek medical attention if your breathing problems get worse quickly, or if you think your asthma medications are not working as well. Read and carefully follow any Instructions for Use provided with your medicine. Ask your doctor or pharmacist if you do not understand these instructions. Rinse your mouth with water without swallowing after each use of your inhaler. Do not allow a young child to use this medicine without help from an adult. It may take 1 or 2 weeks before your symptoms improve. Keep using the medication as directed and tell your doctor if your symptoms do not improve. Your doctor may tell you to stop using formoterol and mometasone once your asthma is well controlled. Your dose needs may change due to surgery, illness, stress, or a recent asthma attack. Do not change your medication dose or schedule without your doctor's advice. If you use a peak flow meter at home, tell your doctor if your numbers are lower than normal.  Your vision and your bone mineral density may need to be checked often. Store at room temperature away from moisture and high heat. The canister may explode if it gets too hot. Do not puncture or burn an empty inhaler canister. Store the 60-inhalation canister on its side, or with the mouthpiece down. Once your asthma is under control, your doctor may want you to stop using this medicine. Do not stop using the medicine unless your doctor tells you to. What happens if I miss a dose? Skip the missed dose and use your next dose at the regular time. Do not use two doses at one time. What happens if I overdose? Seek emergency medical attention or call the Poison Help line at 1-211.851.4640. Overdose symptoms may include chest pain, fast heart rate, and feeling shaky or short of breath.   What should I avoid while using formoterol and mometasone? Do not use a second inhaled bronchodilator that contains formoterol or a similar medicine (such as arformoterol, indacaterol, olodaterol, salmeterol, or vilanterol). Avoid being near people who are sick or have infections. Call your doctor for preventive treatment if you are exposed to chickenpox or measles. These conditions can be serious or even fatal in people who are using a medicine that contains mometasone (a steroid). What are the possible side effects of formoterol and mometasone? Get emergency medical help if you have signs of an allergic reaction: hives; difficulty breathing; swelling of your face, lips, tongue, or throat. Call your doctor at once if you have:  worsening asthma symptoms;  tremors, nervousness, chest pain, fast or pounding heartbeats;  fever, chills, cough with mucus, feeling short of breath;  wheezing, choking, or other breathing problems after using this medication;  blurred vision, tunnel vision, eye pain or redness, or seeing halos around lights;  signs of thrush (a fungal infection) --sores or white patches in your mouth or throat, trouble swallowing;  high blood sugar --increased thirst, increased urination, dry mouth, fruity breath odor;  low potassium level --leg cramps, constipation, irregular heartbeats, fluttering in your chest, numbness or tingling, muscle weakness or limp feeling; or  signs of a hormonal disorder --tiredness or weakness, feeling light-headed, nausea, vomiting. Mometasone can affect growth in children. Tell your doctor if your child is not growing at a normal rate while using this medicine. Common side effects may include:  runny or stuffy nose, sinus pain;  headache; or  cough, sore throat. This is not a complete list of side effects and others may occur. Call your doctor for medical advice about side effects. You may report side effects to FDA at 9-510-FDA-2360.   What other drugs will affect formoterol and mometasone? Sometimes it is not safe to use certain medications at the same time. Some drugs can affect your blood levels of other drugs you take, which may increase side effects or make the medications less effective. Tell your doctor about all your other medicines, especially:  antifungal medicine such as ketoconazole; or  medicine to treat HIV (especially if it contains cobicistat, lopinavir, or ritonavir). This list is not complete. Other drugs may affect formoterol and mometasone, including prescription and over-the-counter medicines, vitamins, and herbal products. Not all possible drug interactions are listed here. Where can I get more information? Your pharmacist can provide more information about formoterol and mometasone. Remember, keep this and all other medicines out of the reach of children, never share your medicines with others, and use this medication only for the indication prescribed. Every effort has been made to ensure that the information provided by [address] is accurate, up-to-date, and complete, but no guarantee is made to that effect. Drug information contained herein may be time sensitive. [facility] information has been compiled for use by healthcare practitioners and consumers in the [facility] and therefore [facility] does not warrant that uses outside of the [facility] are appropriate, unless specifically indicated otherwise. [facility] drug information does not endorse drugs, diagnose patients or recommend therapy. [facility] drug information is an informational resource designed to assist licensed healthcare practitioners in caring for their patients and/or to serve consumers viewing this service as a supplement to, and not a substitute for, the expertise, skill, knowledge and judgment of healthcare practitioners.  The absence of a warning for a given drug or drug combination in no way should be construed to indicate that the drug or drug combination is safe, effective or appropriate for any given patient. [facility] does not assume any responsibility for any aspect of healthcare administered with the aid of information [facility] provides. The information contained herein is not intended to cover all possible uses, directions, precautions, warnings, drug interactions, allergic reactions, or adverse effects. If you have questions about the drugs you are taking, check with your doctor, nurse or pharmacist.  Copyright 3052-7418 [address]: 12.02. Revision date: 2/14/2020. Care instructions adapted under license by [facility] ([facility]). If you have questions about a medical condition or this instruction, always ask your healthcare professional. [place] [address] any warranty or liability for your use of this information. pantoprazole (oral/injection)  Pronunciation:  pan TOE pra zole  Brand:  Protonix  What is the most important information I should know about pantoprazole? Pantoprazole can cause kidney problems. Tell your doctor if you are urinating less than usual, or if you have blood in your urine. Diarrhea may be a sign of a new infection. Call your doctor if you have diarrhea that is watery or has blood in it. Pantoprazole may cause new or worsening symptoms of lupus. Tell your doctor if you have joint pain and a skin rash on your cheeks or arms that worsens in sunlight. You may be more likely to have a broken bone while taking this medicine long term or more than once per day. What is pantoprazole? Pantoprazole is used to treat erosive esophagitis (damage to the esophagus from stomach acid caused by gastroesophageal reflux disease, or GERD) in adults and children who are at least 11years old. Pantoprazole is usually given for up to 8 weeks at a time while your esophagus heals. Pantoprazole is also used to treat Zollinger-Cespedes syndrome and other conditions involving excess stomach acid.   Pantoprazole is not for immediate relief of heartburn. Pantoprazole may also be used for purposes not listed in this medication guide. What should I discuss with my healthcare provider before using pantoprazole? Heartburn can mimic early symptoms of a heart attack. Get emergency medical help if you have chest pain that spreads to your jaw or shoulder and you feel anxious or light-headed. You should not use this medicine if:  you also take medicine that contains rilpivirine ([place]);  if you had breathing problems, kidney problems, or a severe allergic reaction after taking pantoprazole in the past;  you are allergic to pantoprazole or similar medicines (lansoprazole, omeprazole, Nexium, Prevacid, Prilosec, and others). Tell your doctor if you have ever had:  low levels of magnesium in your blood;  lupus; or  osteoporosis or low bone mineral density. You may be more likely to have a broken bone in your hip, wrist, or spine while taking a proton pump inhibitor long-term or more than once per day. Talk with your doctor about ways to keep your bones healthy. Tell your doctor if you are pregnant or breastfeeding. Pantoprazole is not approved for use by anyone younger than 11years old. How should I use pantoprazole? Follow all directions on your prescription label and read all medication guides or instruction sheets. Use the medicine exactly as directed. Use the lowest dose for the shortest amount of time needed to treat your condition. Pantoprazole is taken by mouth (oral) or given as an infusion into a vein (injection). A healthcare provider may teach you how to properly use pantoprazole injection by yourself. Pantoprazole tablets are taken by mouth, with or without food. Pantoprazole oral granules should be taken 30 minutes before a meal.  Do not crush, chew, or break the tablet. Swallow it whole.   The oral granules should be mixed with applesauce or apple juice and given either by mouth or through a nasogastric (NG) tube.  Read and carefully follow any Instructions for Use provided with your medicine. Ask your doctor or pharmacist if you do not understand these instructions. Use this medicine for the full prescribed length of time, even if your symptoms quickly improve. Call your doctor if your symptoms do not improve or if they get worse while you are using this medicine. This medicine can affect the results of certain medical tests. Tell any doctor who treats you that you are using pantoprazole. Pantoprazole may also affect a drug-screening urine test and you may have false results. Tell the laboratory staff that you use this medicine. Store this medicine at room temperature away from moisture, heat, and light. What happens if I miss a dose? Use the medicine as soon as you can, but skip the missed dose if it is almost time for your next dose. Do not use two doses at one time. What happens if I overdose? Seek emergency medical attention or call the Poison Help line at 1-428.333.6365. What should I avoid while using pantoprazole? This medicine can cause diarrhea, which may be a sign of a new infection. If you have diarrhea that is watery or bloody, call your doctor. Do not use anti-diarrhea medicine unless your doctor tells you to. What are the possible side effects of pantoprazole? Get emergency medical help if you have signs of an allergic reaction: hives; difficulty breathing; swelling of your face, lips, tongue, or throat.   Call your doctor at once if you have:  severe stomach pain, diarrhea that is watery or bloody;  sudden pain or trouble moving your hip, wrist, or back;  bruising or swelling where intravenous pantoprazole was injected;  kidney problems -- fever, rash, nausea, loss of appetite, joint pain, urinating less than usual, blood in your urine, weight gain;  low magnesium --dizziness, fast or irregular heart rate, tremors (shaking) or jerking muscle movements, feeling jittery, muscle cramps, muscle spasms in your hands and feet, cough or choking feeling; or  new or worsening symptoms of lupus --joint pain, and a skin rash on your cheeks or arms that worsens in sunlight. Taking pantoprazole long-term may cause you to develop stomach growths called fundic gland polyps. Talk with your doctor about this risk. If you use pantoprazole for longer than 3 years, you could develop a vitamin B-12 deficiency. Talk to your doctor about how to manage this condition if you develop it. Common side effects may include:  headache, dizziness;  stomach pain, gas, nausea, vomiting, diarrhea;  joint pain; or  fever, rash, or cold symptoms (most common in children). This is not a complete list of side effects and others may occur. Call your doctor for medical advice about side effects. You may report side effects to FDA at 6-492-FDA-5331. What other drugs will affect pantoprazole? Tell your doctor about all your other medicines, especially:  digoxin;  methotrexate; or  a diuretic or \"water pill. \"  This list is not complete. Other drugs may affect pantoprazole, including prescription and over-the-counter medicines, vitamins, and herbal products. Not all possible drug interactions are listed here. Where can I get more information? Your pharmacist can provide more information about pantoprazole. Remember, keep this and all other medicines out of the reach of children, never share your medicines with others, and use this medication only for the indication prescribed. Every effort has been made to ensure that the information provided by [address] [address] is accurate, up-to-date, and complete, but no guarantee is made to that effect. Drug information contained herein may be time sensitive.  [employer] information has been compiled for use by healthcare practitioners and consumers in the [place] and therefore [employer] does not warrant that uses outside of the [place] are appropriate, unless specifically indicated otherwise. [facility] drug information does not endorse drugs, diagnose patients or recommend therapy. [facility] drug information is an informational resource designed to assist licensed healthcare practitioners in caring for their patients and/or to serve consumers viewing this service as a supplement to, and not a substitute for, the expertise, skill, knowledge and judgment of healthcare practitioners. The absence of a warning for a given drug or drug combination in no way should be construed to indicate that the drug or drug combination is safe, effective or appropriate for any given patient. [facility] does not assume any responsibility for any aspect of healthcare administered with the aid of information [facility] provides. The information contained herein is not intended to cover all possible uses, directions, precautions, warnings, drug interactions, allergic reactions, or adverse effects. If you have questions about the drugs you are taking, check with your doctor, nurse or pharmacist.  Copyright 2918-7311 [address]. Version: 21.01. Revision date: 1/4/2021. Care instructions adapted under license by [facility] ([facility]). If you have questions about a medical condition or this instruction, always ask your healthcare professional. [place] [address] any warranty or liability for your use of this information.

## 2023-02-02 NOTE — PROGRESS NOTES
Discharge instructions were reviewed with patient. An opportunity was given for questions. All medications were reviewed, and information was given on the new medications. Patient verbalized understanding, and has no questions at this time.    Iv removed

## 2023-02-03 NOTE — PROGRESS NOTES
Physician Progress Note      Juan Manuel Qureshi  CSN #:                  [id number]  :                       1945  ADMIT DATE:       2023 3:28 AM  DISCH DATE:        2023 7:46 AM  RESPONDING  PROVIDER #:        Laura Witt MD          QUERY TEXT:    Patient admitted with weakness, supratherapeutic INR ---BMI of 16 . If   possible, please document in progress notes and discharge summary if you are   evaluating and /or treating any of the following: The medical record reflects the following:  Risk Factors: COPD, depression, esophageal stricture--dilated, dementia,   anxiety  Clinical Indicators: BMI of 16. No RD consult yet, per notes--thin,frail   unhealthy appearing female  Treatment: fluids, labs, xray, essential home meds. ASPEN Criteria:    https://aspenjournals. onlinelibrary. edmond. com/doi/full/10.1177/921642825562364  5  Options provided:  -- Protein calorie malnutrition mild  -- Protein calorie malnutrition moderate  -- Protein calorie malnutrition severe  -- Underweight with BMI of 16  -- Other - I will add my own diagnosis  -- Disagree - Not applicable / Not valid  -- Disagree - Clinically unable to determine / Unknown  -- Refer to Clinical Documentation Reviewer    PROVIDER RESPONSE TEXT:    This patient is underweight with a BMI of 16. Query created by: Marion Corley on 2023 2:38 PM      QUERY TEXT:    Patient admitted with weakness , noted to have a supratherapeutic INR of >9 . If possible, please document in progress notes and discharge summary if you   are evaluating and/or treating any of the following: The medical record reflects the following:  Risk Factors: Afib, CHF, HLD, hx of a CABG, CAD  Clinical Indicators: INR >9. vitamin K 5mg po on admission--improved to 1.6. Pt has not seen her cardiologist in [address] [place] [address] because he quit--has not had her INR   checked since her physician quit. Hgb--8.9 dropping to 5.9. dark stools.  No   clear source of anemia. For possible scopes  Treatment: Vitamin K, labs, transfusion, GI consult, xray, cards consult,   essential home meds. Options provided:  -- Accidental overdose/toxicity of coumadin  -- Adverse effect of coumadin, properly administered  -- Other - I will add my own diagnosis  -- Disagree - Not applicable / Not valid  -- Disagree - Clinically unable to determine / Unknown  -- Refer to Clinical Documentation Reviewer    PROVIDER RESPONSE TEXT:    This patient had an accidental overdose/toxicity of coumadin.     Query created by: Bree Mena on 1/31/2023 2:48 PM      Electronically signed by:  Janes Schofield MD 2/3/2023 9:16 AM

## 2023-02-07 NOTE — PROGRESS NOTES
Physician Progress Note      Aren Cagle  [facility] #:                  [id number]  :                       1945  ADMIT DATE:       2023 3:28 AM  DISCH DATE:        2023 7:46 AM  RESPONDING  PROVIDER #:        Del Ophelia MD        QUERY TEXT:    Type of Encephalopathy: Please provide further specificity, if known. Clinical indicators include: acute, encephalopathy  Options provided:  -- Anoxic/hypoxic encephalopathy  -- Metabolic encephalopathy  -- Toxic encephalopathy  -- Hepatic encephalopathy  -- Hypertensive encephalopathy  -- Other - I will add my own diagnosis  -- Disagree - Not applicable / Not valid  -- Disagree - Clinically Unable to determine / Unknown        PROVIDER RESPONSE TEXT:    The patient has metabolic encephalopathy.       Electronically signed by:  Antolin Jacinto MD 2023 5:10 PM